# Patient Record
(demographics unavailable — no encounter records)

---

## 2024-10-07 NOTE — PHYSICAL EXAM
[No Acute Distress] : no acute distress [Well Nourished] : well nourished [Well Developed] : well developed [Well-Appearing] : well-appearing [Normal Sclera/Conjunctiva] : normal sclera/conjunctiva [PERRL] : pupils equal round and reactive to light [EOMI] : extraocular movements intact [Normal Outer Ear/Nose] : the outer ears and nose were normal in appearance [Normal Oropharynx] : the oropharynx was normal [No JVD] : no jugular venous distention [No Lymphadenopathy] : no lymphadenopathy [Supple] : supple [Thyroid Normal, No Nodules] : the thyroid was normal and there were no nodules present [No Respiratory Distress] : no respiratory distress  [No Accessory Muscle Use] : no accessory muscle use [Clear to Auscultation] : lungs were clear to auscultation bilaterally [Normal Rate] : normal rate  [Regular Rhythm] : with a regular rhythm [Normal S1, S2] : normal S1 and S2 [No Murmur] : no murmur heard [No Carotid Bruits] : no carotid bruits [No Varicosities] : no varicosities [Pedal Pulses Present] : the pedal pulses are present [No Edema] : there was no peripheral edema [No Extremity Clubbing/Cyanosis] : no extremity clubbing/cyanosis [Soft] : abdomen soft [Non-distended] : non-distended [No Masses] : no abdominal mass palpated [Normal Posterior Cervical Nodes] : no posterior cervical lymphadenopathy [Normal Anterior Cervical Nodes] : no anterior cervical lymphadenopathy [No CVA Tenderness] : no CVA  tenderness [No Spinal Tenderness] : no spinal tenderness [No Joint Swelling] : no joint swelling [Grossly Normal Strength/Tone] : grossly normal strength/tone [No Rash] : no rash [Coordination Grossly Intact] : coordination grossly intact [No Focal Deficits] : no focal deficits [Normal Gait] : normal gait [Normal Affect] : the affect was normal [Alert and Oriented x3] : oriented to person, place, and time [Normal Insight/Judgement] : insight and judgment were intact

## 2024-10-09 NOTE — HEALTH RISK ASSESSMENT
[0] : 2) Feeling down, depressed, or hopeless: Not at all (0) [PHQ-2 Negative - No further assessment needed] : PHQ-2 Negative - No further assessment needed [Never] : Never [[id number]] : 0

## 2024-10-09 NOTE — HEALTH RISK ASSESSMENT
[0] : 2) Feeling down, depressed, or hopeless: Not at all (0) [PHQ-2 Negative - No further assessment needed] : PHQ-2 Negative - No further assessment needed [Never] : Never [[id number]] : 0

## 2024-10-09 NOTE — HISTORY OF PRESENT ILLNESS
[Family Member] : family member [FreeTextEntry1] : 2-month f/u  [de-identified] : This is a 64 y/o female with a pmhx of HLD, HTN, Hypothyroid, PreDM, fibromyalgia, chronic shoulder pain, knee pain, bell's palsy, here today for 2-month f/u. Her weight remains stable around 130 +/- 5 lbs. She goes to therapy for her bell palsy. Patient feels well.  Denies chest pain, sob, ellison, dizziness, orthopnea, diaphoresis, palpitations, LE swelling, syncope, n/v, headache.

## 2024-10-09 NOTE — HISTORY OF PRESENT ILLNESS
[Family Member] : family member [FreeTextEntry1] : 2-month f/u  [de-identified] : This is a 62 y/o female with a pmhx of HLD, HTN, Hypothyroid, PreDM, fibromyalgia, chronic shoulder pain, knee pain, bell's palsy, here today for 2-month f/u. Her weight remains stable around 130 +/- 5 lbs. She goes to therapy for her bell palsy. Patient feels well.  Denies chest pain, sob, ellison, dizziness, orthopnea, diaphoresis, palpitations, LE swelling, syncope, n/v, headache.

## 2024-10-09 NOTE — HEALTH RISK ASSESSMENT
[0] : 2) Feeling down, depressed, or hopeless: Not at all (0) [PHQ-2 Negative - No further assessment needed] : PHQ-2 Negative - No further assessment needed [Never] : Never [[id number]] : 0

## 2024-10-09 NOTE — HISTORY OF PRESENT ILLNESS
[Family Member] : family member [FreeTextEntry1] : 2-month f/u  [de-identified] : This is a 64 y/o female with a pmhx of HLD, HTN, Hypothyroid, PreDM, fibromyalgia, chronic shoulder pain, knee pain, bell's palsy, here today for 2-month f/u. Her weight remains stable around 130 +/- 5 lbs. She goes to therapy for her bell palsy. Patient feels well.  Denies chest pain, sob, ellison, dizziness, orthopnea, diaphoresis, palpitations, LE swelling, syncope, n/v, headache.

## 2024-10-09 NOTE — ADDENDUM
[FreeTextEntry1] : This note was written by Xochitl Owen on 10/08/2024 acting as medical scribe for Dr. Akhil Rinaldi. I, Dr. Akhil Rinaldi, have read and attest that all the information, medical decision making and discharge instructions within are true and accurate.

## 2024-11-04 NOTE — PHYSICAL EXAM

## 2024-11-11 NOTE — ADDENDUM
PATIENT IN BED AWAKE, COMFORTABLE IN BED, HOOKED TO MONITOR, BREATHING EVEN AND 
UNLABORED. WILL CONTINUE TO MONITOR ACCORDINGLY. [FreeTextEntry1] : Entered by Phoenix Amato, acting as scribe for Dr. Gonzalez Woods. The documentation recorded by the scribe accurately reflects the service I personally performed and the decisions made by me.

## 2024-11-11 NOTE — ADDENDUM
[FreeTextEntry1] : Entered by Phoenix Amato, acting as scribe for Dr. Gonzalez Woods. The documentation recorded by the scribe accurately reflects the service I personally performed and the decisions made by me.

## 2024-11-11 NOTE — LETTER BODY
[FreeTextEntry1] : Avinash Rodriguez MD [address] [facility] [address] [facility], [address] [place] [address] [phone]  Dear Dr. Rodriguez,     Reason for visit: Microscopic hematuria     This is a 63 year-old woman with microscopic hematuria. Patient returns today for follow-up. Patient was last seen by Dr. Epstein last year had previous cystoscopy in 2020. Patient expresses interest in female urologist evaluation. She denies any gross hematuria, dysuria or urinary incontinence. The patient denies any aggravating or relieving factors. The patient denies any interference of function. The patient is entirely asymptomatic. All other review of systems are negative. She has no cancer in her family medical history. She has no previous surgical history. Past medical history, family history and social history were inquired and were noncontributory to current condition. The patient does not use tobacco or drink alcohol. Medications and allergies were reviewed. She has no known allergies to medication.     On examination, the patient is a anxious-appearing woman in no acute distress. She is alert and oriented follows commands. She has normal mood and affect. She is normocephalic. Neck is supple. Oral no thrush. Respirations are unlabored. Abdomen is soft and nontender. Bladder is nonpalpable. No CVA tenderness. Neurologically she is grossly intact. No peripheral edema. Skin without gross abnormality.      Assessment: Microscopic hematuria.     I counseled the patient on the various etiologies of the microscopic hematuria. I discussed the risk of occult malignancy. I counseled the patient about microscopic hematuria. I recommended the patient obtain urinalysis, urine culture, and urine cytology to evaluate for infections and high grade urothelial carcinoma. I counseled the patient about the procedures. I answered the patient's questions. The risks and expected outcomes were discussed. Patient is interested in a female urologist to continue her care. The patient will follow up as directed and contact me with any questions or concerns. Thank you for the opportunity to participate in the care of Ms. HAYES. I will keep you updated on her progress.     Plan: Urine culture. Urinalysis. Urine cytology. Follow-up with female urologist.

## 2024-11-11 NOTE — LETTER BODY
[FreeTextEntry1] : Avinash Rodriguez MD [address] [facility], [address] [place] [address] [phone]  Dear Dr. Rodriguez,     Reason for visit: Microscopic hematuria     This is a 63 year-old woman with microscopic hematuria. Patient returns today for follow-up. Patient was last seen by Dr. Epstein last year had previous cystoscopy in 2020. Patient expresses interest in female urologist evaluation. She denies any gross hematuria, dysuria or urinary incontinence. The patient denies any aggravating or relieving factors. The patient denies any interference of function. The patient is entirely asymptomatic. All other review of systems are negative. She has no cancer in her family medical history. She has no previous surgical history. Past medical history, family history and social history were inquired and were noncontributory to current condition. The patient does not use tobacco or drink alcohol. Medications and allergies were reviewed. She has no known allergies to medication.     On examination, the patient is a anxious-appearing woman in no acute distress. She is alert and oriented follows commands. She has normal mood and affect. She is normocephalic. Neck is supple. Oral no thrush. Respirations are unlabored. Abdomen is soft and nontender. Bladder is nonpalpable. No CVA tenderness. Neurologically she is grossly intact. No peripheral edema. Skin without gross abnormality.      Assessment: Microscopic hematuria.     I counseled the patient on the various etiologies of the microscopic hematuria. I discussed the risk of occult malignancy. I counseled the patient about microscopic hematuria. I recommended the patient obtain urinalysis, urine culture, and urine cytology to evaluate for infections and high grade urothelial carcinoma. I counseled the patient about the procedures. I answered the patient's questions. The risks and expected outcomes were discussed. Patient is interested in a female urologist to continue her care. The patient will follow up as directed and contact me with any questions or concerns. Thank you for the opportunity to participate in the care of Ms. HAYES. I will keep you updated on her progress.     Plan: Urine culture. Urinalysis. Urine cytology. Follow-up with female urologist.

## 2024-11-11 NOTE — HISTORY OF PRESENT ILLNESS
[FreeTextEntry1] : Follow-up female hematuria.  Patient today was discussed with Dr. Lucian Antoine.  She denies any gross hematuria,  Repeat urinalysis.  Patient is interested in a female urologist to continue her care.  Please refer to URO Consult Note.

## 2024-11-30 NOTE — PHYSICAL EXAM
[No Acute Distress] : no acute distress [Well Nourished] : well nourished [Well Developed] : well developed [Well-Appearing] : well-appearing [Normal Sclera/Conjunctiva] : normal sclera/conjunctiva [PERRL] : pupils equal round and reactive to light [EOMI] : extraocular movements intact [Normal Outer Ear/Nose] : the outer ears and nose were normal in appearance [Normal Oropharynx] : the oropharynx was normal [No JVD] : no jugular venous distention [No Lymphadenopathy] : no lymphadenopathy [Supple] : supple [Thyroid Normal, No Nodules] : the thyroid was normal and there were no nodules present [No Respiratory Distress] : no respiratory distress  [No Accessory Muscle Use] : no accessory muscle use [Clear to Auscultation] : lungs were clear to auscultation bilaterally [Normal Rate] : normal rate  [Regular Rhythm] : with a regular rhythm [Normal S1, S2] : normal S1 and S2 [No Murmur] : no murmur heard [No Carotid Bruits] : no carotid bruits [No Varicosities] : no varicosities [Pedal Pulses Present] : the pedal pulses are present [No Edema] : there was no peripheral edema [No Extremity Clubbing/Cyanosis] : no extremity clubbing/cyanosis [Soft] : abdomen soft [Non-distended] : non-distended [Normal Posterior Cervical Nodes] : no posterior cervical lymphadenopathy [Normal Anterior Cervical Nodes] : no anterior cervical lymphadenopathy [No CVA Tenderness] : no CVA  tenderness [No Spinal Tenderness] : no spinal tenderness [No Joint Swelling] : no joint swelling [Grossly Normal Strength/Tone] : grossly normal strength/tone [No Rash] : no rash [Coordination Grossly Intact] : coordination grossly intact [No Focal Deficits] : no focal deficits [Normal Gait] : normal gait [Normal Affect] : the affect was normal [Alert and Oriented x3] : oriented to person, place, and time [Normal Insight/Judgement] : insight and judgment were intact

## 2024-12-03 NOTE — HEALTH RISK ASSESSMENT
[0] : 2) Feeling down, depressed, or hopeless: Not at all (0) [PHQ-2 Negative - No further assessment needed] : PHQ-2 Negative - No further assessment needed [Never] : Never [[id number]] : 0

## 2024-12-03 NOTE — ADDENDUM
[FreeTextEntry1] : This note was written by Xochitl Owen on 12/03/2024 acting as medical scribe for Dr. Akhil Rinaldi. I, Dr. Akhil Rinaldi, have read and attest that all the information, medical decision making and discharge instructions within are true and accurate.

## 2024-12-03 NOTE — HISTORY OF PRESENT ILLNESS
[FreeTextEntry1] : 2-month f/u [de-identified] : This is a 64 y/o female with a pmhx of HLD, HTN, Hypothyroid, PreDM, fibromyalgia, chronic shoulder pain, knee pain, bell's palsy, here today for 2-month f/u.   Denies chest pain, SOB, MARTINEZ, dizziness, diaphoresis, palpitations, LE swelling, orthopnea, syncope, n/v, headache.

## 2025-01-13 NOTE — HEALTH RISK ASSESSMENT
[0] : 2) Feeling down, depressed, or hopeless: Not at all (0) [PHQ-2 Negative - No further assessment needed] : PHQ-2 Negative - No further assessment needed [Never] : Never [[id number]] : 0

## 2025-01-13 NOTE — HISTORY OF PRESENT ILLNESS
[Family Member] : family member [FreeTextEntry1] : 3-month f/u  [de-identified] : This is a 62 y/o female with a pmhx of HLD, HTN, Hypothyroid, PreDM, fibromyalgia, chronic shoulder pain, knee pain, bell's palsy, here today for 3-month f/u. Pt did a CT abd/pelvis on 1/4 that showed No CT evidence of occult malignancy, however limited evaluation of solid organ vessels secondary to lack of intravenous contrast administration. Pt says she has pain on her L shoulder due to a recent fall. Pt says she has been taking Tylenol and a muscle relaxer for her shoulder pain, follows ortho. Pt says she has a runny nose for about 2-3 days now. Denies any fever, cough, chills.  Denies chest pain, SOB, MARTINEZ, dizziness, diaphoresis, palpitations, LE swelling, orthopnea, syncope, n/v, headache.

## 2025-01-13 NOTE — HISTORY OF PRESENT ILLNESS
[FreeTextEntry1] : Ms. RILEY HAYES is a 63-year-old female who returns today in follow up with regard to a history of hypothyroidism. She is s/p s/p total thyroidectomy in 2017. The patient was diagnosed with small focus of papillary thyroid ca noted at her surgery for very large nodular goiter several years ago.   Thyroglobulin on 8/8/23 at 0.26 with negative antibodies.  Thyroid US 7/2023 showed level 2 and level 5 LN. Bx per Dr. Ramos- result pos for reactive LN.  Repeat thyroid US 1/30/24 did show indeterminate isoechoic nodule with cystic spaces in the midline neck, measuring 2.2 x 0.9 x 1.5 cm possibly residual thyroid tissue. Consider further evaluation with CT neck with contrast. Unchanged b/l cervical lymph nodes, without suspicious imaging features.  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  For the hypothyroidism, the patient is currently taking LT4 112 mcg daily.   TSH had returned low at 0.05 in Feb 2024 while taking Synthroid 137mcg daily. Latest TSH returned highly elevated at 16.60 on 01/07/25.  She has been compliant in taking the LT4 daily, away from food or any medication that may inhibit absorption. She has tolerated this medication well. Without any apparent adverse effects. She denies any temperature intolerance, significant weight changes, or severe fatigue. She in addition denies any palpitations, tremors, anxiousness, change in bowel habits or significant change in moods.  Patient has lost about 30 pounds in 2023. She has been following dietary discretion and increased her physical activity.  Her current weight is ?? pounds, previously 136 pounds in November 2024.  ____________________________________________________________________________________________________ Had colonoscopy with Dr. Spear.  Had received first cortisone injection in right shoulder and 3rd injection in left knee. Following with ortho and is to start PT.   Additional medical history includes of hypertension, hypokalemia, CPA tumor, Pre-Dm, Thalassemia Trait and vitamin d deficiency. 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 Still pain rt side of face, rt shoulder, and in multiple joints. Undergoing Rheumatology eval. Is doing electrode therapy.   Incr peter/renin ratio- is though now on spironolactone  Neurologist: Dr. Guerrier - retired-now to find new neurologist. Nephrologist : Dr. Nunn Surgeon: Dr. Elif Garza?

## 2025-01-13 NOTE — HEALTH RISK ASSESSMENT
[0] : 2) Feeling down, depressed, or hopeless: Not at all (0) [PHQ-2 Negative - No further assessment needed] : PHQ-2 Negative - No further assessment needed [Never] : Never [[id number]] : 0

## 2025-01-13 NOTE — ADDENDUM
[FreeTextEntry1] : This note was written by Xochitl Owen on 01/07/2025 acting as medical scribe for Dr. Akhil Rinaldi. I, Dr. Akhil Rinaldi, have read and attest that all the information, medical decision making and discharge instructions within are true and accurate.

## 2025-01-13 NOTE — HISTORY OF PRESENT ILLNESS
[Family Member] : family member [FreeTextEntry1] : 3-month f/u  [de-identified] : This is a 62 y/o female with a pmhx of HLD, HTN, Hypothyroid, PreDM, fibromyalgia, chronic shoulder pain, knee pain, bell's palsy, here today for 3-month f/u. Pt did a CT abd/pelvis on 1/4 that showed No CT evidence of occult malignancy, however limited evaluation of solid organ vessels secondary to lack of intravenous contrast administration. Pt says she has pain on her L shoulder due to a recent fall. Pt says she has been taking Tylenol and a muscle relaxer for her shoulder pain, follows ortho. Pt says she has a runny nose for about 2-3 days now. Denies any fever, cough, chills.  Denies chest pain, SOB, MARTINEZ, dizziness, diaphoresis, palpitations, LE swelling, orthopnea, syncope, n/v, headache.

## 2025-01-13 NOTE — HEALTH RISK ASSESSMENT
[0] : 2) Feeling down, depressed, or hopeless: Not at all (0) [PHQ-2 Negative - No further assessment needed] : PHQ-2 Negative - No further assessment needed [Never] : Never [[id number]] : 0

## 2025-01-13 NOTE — HISTORY OF PRESENT ILLNESS
[Family Member] : family member [FreeTextEntry1] : 3-month f/u  [de-identified] : This is a 62 y/o female with a pmhx of HLD, HTN, Hypothyroid, PreDM, fibromyalgia, chronic shoulder pain, knee pain, bell's palsy, here today for 3-month f/u. Pt did a CT abd/pelvis on 1/4 that showed No CT evidence of occult malignancy, however limited evaluation of solid organ vessels secondary to lack of intravenous contrast administration. Pt says she has pain on her L shoulder due to a recent fall. Pt says she has been taking Tylenol and a muscle relaxer for her shoulder pain, follows ortho. Pt says she has a runny nose for about 2-3 days now. Denies any fever, cough, chills.  Denies chest pain, SOB, MARTINEZ, dizziness, diaphoresis, palpitations, LE swelling, orthopnea, syncope, n/v, headache.

## 2025-01-24 NOTE — ASSESSMENT
[FreeTextEntry1] : 1) Nevi/lentigines - Counseled about clinically benign lesions - Discussed regular OTC sunscreen use, SPF 30 or higher   2) Excess skin s/p weight loss  - Advised to continue to f/u with PCP for preventative standard cancer screenings - Discussed there are no topical agents to help treat excess skin. We discussed a surgical referral may be considered, though she declined to do so today - Discussed liberal moisturizer use   RTC prn   ** Attending only per patient request**

## 2025-01-24 NOTE — HISTORY OF PRESENT ILLNESS
[FreeTextEntry1] : f/u spots [de-identified] : 64 y/o F w/ spots on the skin and skin sagging after losing weight over years.   ** Attending only per patient request**

## 2025-01-24 NOTE — PHYSICAL EXAM
[FreeTextEntry3] : focused exam of a few areas on the trunk and upper extremities per patient request Fairly uniform and regular brown macules and papules on the trunk and upper extremities

## 2025-02-04 NOTE — ADDENDUM
[FreeTextEntry1] : Blood will be drawn in office today.  This note was written by Jamee Randle on 01/31/2025 acting as medical scribe for Dr. Haim Samson. I, Dr. Haim Samson, have read and attest that all the information, medical decision making and discharge instructions within are true and accurate.

## 2025-02-04 NOTE — HISTORY OF PRESENT ILLNESS
[FreeTextEntry1] : Ms. RILEY HAYES is a 63-year-old female who returns today in follow up with regard to a history of hypothyroidism. She is s/p s/p total thyroidectomy in 2017. The patient was diagnosed with small focus of papillary thyroid ca noted at her surgery for very large nodular goiter several years ago.   Additional medical history includes of hypertension, hypokalemia, CPA tumor, Pre-Dm, Thalassemia Trait and vitamin d deficiency. 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 Still pain rt side of face, rt shoulder, and in multiple joints. Undergoing Rheumatology eval. Is doing electrode therapy.   Thyroglobulin stable on 08/05/24 at 13.50 with negative antibodies.  Thyroid US 7/2023 showed level 2 and level 5 LN. Bx per Dr. Ramos- result pos for reactive LN.  Repeat thyroid US 1/30/24 did show indeterminate isoechoic nodule with cystic spaces in the midline neck, measuring 2.2 x 0.9 x 1.5 cm possibly residual thyroid tissue. Consider further evaluation with CT neck with contrast. Unchanged b/l cervical lymph nodes, without suspicious imaging features.  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  For the hypothyroidism, the patient is currently taking LT4 125 mcg daily.  She did miss 1-2 doses of Synthroid due to the flu.  TSH had returned low at 0.05 in Feb 2024 while taking Synthroid 137 mcg daily. Latest TSH returned 4.69 on 6/14/24 on dose of 112 mcg daily.   Recent TSH highly elevated at 16.60 on 01/06/25.  She has been compliant in taking the LT4 daily, away from food or any medication that may inhibit absorption. She has tolerated this medication well. Without any apparent adverse effects. She denies any temperature intolerance, significant weight changes, or severe fatigue. She in addition denies any palpitations, tremors, anxiousness, change in bowel habits or significant change in moods.  Patient has lost about 30 pounds over the last year. She has been following dietary discretion and increased her physical activity. Her dermatologist was worried about anorexia as the patient had lost some weight; now has gained some back. Her current weight is 133 pounds, previously 120 pounds in Nov 2024.  Saw ophthalmology and being monitored for floaters. ____________________________________________________________________________________________________ Had colonoscopy with Dr. Spear.  Received first cortisone injection in right shoulder and 3rd injection in left knee. Following with ortho and has started PT.  Has been going to PT for her Bell's palsy and her shoulder.  Like Opal, she too contracted the flu and accompanied him to [facility]. She had shivers but denied sweats. While Opal received a Covid test, she was not checked at all. Entire family is sick with the flu.  Incr peter/renin ratio- is though now on spironolactone  Neurologist: Dr. Guerrier - retired-now to find new neurologist. Nephrologist : Dr. Nunn Surgeon: Dr. Elif Garza?

## 2025-02-04 NOTE — ADDENDUM
[FreeTextEntry1] : Blood will be drawn in office today.  This note was written by Jamee Randle on 01/31/2025 acting as medical scribe for Dr. Hiam Samson. I, Dr. Haim Samson, have read and attest that all the information, medical decision making and discharge instructions within are true and accurate.

## 2025-02-07 NOTE — ASSESSMENT
[FreeTextEntry1] : 1- check urine - ( voided before SC )  2- discussed that if urine again blood with epith cells - optiions are recheck SC urine or proceed with eval sunitha  FARA  and cysto -- patient woud like to proceed eval as she states that she is a hyponcondriac

## 2025-02-07 NOTE — PHYSICAL EXAM
[Fully active, able to carry on all pre-disease performance without restriction] : Status 0 - Fully active, able to carry on all pre-disease performance without restriction [Normal] : no peripheral adenopathy appreciated [de-identified] : b/l mild LE lymphedema [de-identified] : no CVAT [de-identified] : mild left Bell's Palsy

## 2025-02-07 NOTE — PHYSICAL EXAM
[Normal Appearance] : normal appearance [Well Groomed] : well groomed [General Appearance - In No Acute Distress] : no acute distress [Edema] : no peripheral edema [Respiration, Rhythm And Depth] : normal respiratory rhythm and effort [Exaggerated Use Of Accessory Muscles For Inspiration] : no accessory muscle use [Abdomen Soft] : soft [Abdomen Tenderness] : non-tender [Costovertebral Angle Tenderness] : no ~M costovertebral angle tenderness [Normal Station and Gait] : the gait and station were normal for the patient's age [] : no rash [No Focal Deficits] : no focal deficits [Oriented To Time, Place, And Person] : oriented to person, place, and time [Affect] : the affect was normal [Mood] : the mood was normal [No Palpable Adenopathy] : no palpable adenopathy [de-identified] : pvr- zero

## 2025-02-07 NOTE — HISTORY OF PRESENT ILLNESS
[FreeTextEntry1] : Nov 2024 ( Dr Gonzalez Woods) This is a 63 year-old woman with microscopic hematuria. Patient returns today for follow-up. Patient was last seen by Dr. Epstein last year had previous cystoscopy in 2020. Patient expresses interest in female urologist evaluation. She denies any gross hematuria, dysuria or urinary incontinence. The patient denies any aggravating or relieving factors. The patient denies any interference of function. The patient is entirely asymptomatic. All other review of systems are negative. She has no cancer in her family medical history. She has no previous surgical history. Past medical history, family history and social history were inquired and were noncontributory to current condition. The patient does not use tobacco or drink alcohol. Medications and allergies were reviewed. She has no known allergies to medication. Plan: Urine culture. Urinalysis. Urine cytology. Follow-up with female urologist.  UA : 6 red cells and 7 epith cells, cytology and cx : negative.  Here for f/u: some urgency , no INC unless if holds too long, no dysuria or hematjira , occa liner use  nocturia 4-5 x related to fljuids  no gross hematria or dysuria  CT SCAN ( Jan 2025)  KIDNEYS/URETERS: Within normal limits.  BLADDER: Within normal limits. REPRODUCTIVE ORGANS: Uterus and adnexa within normal limits

## 2025-02-07 NOTE — END OF VISIT
[] : Fellow [FreeTextEntry3] : Hgb sl decrease, w/u as outlined above. Recheck Fe labs. B12, folate wnl 9/2024. > 25 lb wt loss now reversin. Had neg CT C/A/P 1/2025.

## 2025-02-07 NOTE — ASSESSMENT
[Medication(s)] : Medication(s) [Palliative] : Goals of care discussed with patient: Palliative [Palliative Care Plan] : not applicable at this time [With Patient/Caregiver] : With Patient/Caregiver [Designated Health Care Proxy] : Designated Health Care Proxy [Name: ___] : Name: [unfilled] [Relationship: ___] : Relationship: [unfilled] [FreeTextEntry1] : Mild microcytic anemia in 62 yo woman of [place] background.   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 She has no evidence of iron deficiency CBC results reviewed and d/w pt WBC 7.0, Hgb 10.7, MCV 70.8, Plt 312K, nl diff leukocytosis, thrombocytosis for the most part have not recurred   has had low serum B12 levels in past, taking B12 po and level in Sept 2024 adeqaute Unclear etiology of drop in hemoglobin -- may be secondary to inadequate dietary intake vs. hypothyroidism vs. microscopic blood loss >> Will repeat iron studies + ferritin today >> Monitor CBC >> No gross evidence of GI bleed or hematuria Longstanding facial pain, h/o Bell's Palsy - Follow up closely with PCP  follow up CMP, LDH RV 6 months or sooner if needed  Patient seen and discussed with Dr. Rock. Ruthie Paula DO, MPH Medical Oncology Fellow [AdvancecareDate] : 08/01/2024

## 2025-02-07 NOTE — ASSESSMENT
[Medication(s)] : Medication(s) [Palliative] : Goals of care discussed with patient: Palliative [Palliative Care Plan] : not applicable at this time [With Patient/Caregiver] : With Patient/Caregiver [Designated Health Care Proxy] : Designated Health Care Proxy [Name: ___] : Name: [unfilled] [Relationship: ___] : Relationship: [unfilled] [FreeTextEntry1] : Mild microcytic anemia in 62 yo woman of [place] background.   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 She has no evidence of iron deficiency CBC results reviewed and d/w pt WBC 7.0, Hgb 10.7, MCV 70.8, Plt 312K, nl diff leukocytosis, thrombocytosis for the most part have not recurred   has had low serum B12 levels in past, taking B12 po and level in Sept 2024 adeqaute Unclear etiology of drop in hemoglobin -- may be secondary to inadequate dietary intake vs. hypothyroidism vs. microscopic blood loss >> Will repeat iron studies + ferritin today >> Monitor CBC >> No gross evidence of GI bleed or hematuria Longstanding facial pain, h/o Bell's Palsy - Follow up closely with PCP  follow up CMP, LDH RV 6 months or sooner if needed  Patient seen and discussed with Dr. Rock. Ruthie Paula DO, MPH Medical Oncology Fellow [AdvancecareDate] : 08/01/2024

## 2025-02-07 NOTE — HISTORY OF PRESENT ILLNESS
[Disease:__________________________] : Disease: [unfilled] [de-identified] : Ms. Edwards is a 60 year old woman referred for evaluation of microcytic anemia. Father and one son has h/o thalassemia, otherwise not specified  She states that she has thalassemia trait, has been tested in the past.  Hgb electrophoresis x 2 neg for beta thal trait..  She also has had thrombocytosis noted in 6 of ten CBCs since 9/2020, ranging between 403K and 548K.  Three of ten CBCs had mild incr WBC up to 11.16 with nl diffs; Hgb has ranged bet. 11.1 and 12.4, MCV bet. 69.6-71.5. She has a h/o excised benign left breast cyst, left leg lymphedema, left temporal Schwannoma treated with radiotherapy which subsequently lead to left Bell's Palsy from which she has mostly recovered from, B12 deficiency, HTN, DJD, HLD. She has had a thyroidectomy for papillary thyroid cancer. She has no GI c/o, no melena, BRBPR. [de-identified] : Moderate fatigue Genetic test (+) for alpha thal trait The two mutated alpha  genes are arranged in trans stable vestibular Schwannoma being followed had 2500 cGy RT in 2019 MRI brain 5/22/2023 unchanged findings   2/6/25 Here for follow up. Reports significant weight loss over the last year but also significant life stressors. CT CAP negative for occult malignancy, no hepatosplenomegaly or lymphadenopathy. Hgb downtrending today. Denies blood per rectum/melena, but was noted to have some microscopic hematuria for which she is following with urology. Last colonoscopy 12/2023 with a 6 mm polyp, which path showed to be a sessile serrated lesion/polyp.  Continues to have chronic numbness, pain  left side of face, lips post Bell's Palsy.

## 2025-02-07 NOTE — PHYSICAL EXAM
[Fully active, able to carry on all pre-disease performance without restriction] : Status 0 - Fully active, able to carry on all pre-disease performance without restriction [Normal] : no peripheral adenopathy appreciated [de-identified] : b/l mild LE lymphedema [de-identified] : no CVAT [de-identified] : mild left Bell's Palsy

## 2025-02-07 NOTE — HISTORY OF PRESENT ILLNESS
[Disease:__________________________] : Disease: [unfilled] [de-identified] : Ms. Edwards is a 60 year old woman referred for evaluation of microcytic anemia. Father and one son has h/o thalassemia, otherwise not specified  She states that she has thalassemia trait, has been tested in the past.  Hgb electrophoresis x 2 neg for beta thal trait..  She also has had thrombocytosis noted in 6 of ten CBCs since 9/2020, ranging between 403K and 548K.  Three of ten CBCs had mild incr WBC up to 11.16 with nl diffs; Hgb has ranged bet. 11.1 and 12.4, MCV bet. 69.6-71.5. She has a h/o excised benign left breast cyst, left leg lymphedema, left temporal Schwannoma treated with radiotherapy which subsequently lead to left Bell's Palsy from which she has mostly recovered from, B12 deficiency, HTN, DJD, HLD. She has had a thyroidectomy for papillary thyroid cancer. She has no GI c/o, no melena, BRBPR. [de-identified] : Moderate fatigue Genetic test (+) for alpha thal trait The two mutated alpha  genes are arranged in trans stable vestibular Schwannoma being followed had 2500 cGy RT in 2019 MRI brain 5/22/2023 unchanged findings   2/6/25 Here for follow up. Reports significant weight loss over the last year but also significant life stressors. CT CAP negative for occult malignancy, no hepatosplenomegaly or lymphadenopathy. Hgb downtrending today. Denies blood per rectum/melena, but was noted to have some microscopic hematuria for which she is following with urology. Last colonoscopy 12/2023 with a 6 mm polyp, which path showed to be a sessile serrated lesion/polyp.  Continues to have chronic numbness, pain  left side of face, lips post Bell's Palsy.

## 2025-02-07 NOTE — REVIEW OF SYSTEMS
[Loss of Hearing] : loss of hearing [Joint Pain] : joint pain [Muscle Pain] : muscle pain [Dizziness] : dizziness [Difficulty Walking] : difficulty walking [Negative] : Allergic/Immunologic [Dysphagia] : no dysphagia [Odynophagia] : no odynophagia [Mucosal Pain] : no mucosal pain [Abdominal Pain] : no abdominal pain [Joint Stiffness] : no joint stiffness [Skin Wound] : no skin wound [Confused] : no confusion [Easy Bleeding] : no tendency for easy bleeding [Easy Bruising] : no tendency for easy bruising [Swollen Glands] : no swollen glands [FreeTextEntry9] : bilat shoulder pain [de-identified] : Schwannoma, as above; Bell's Palsy, occas fall, headache; facial pain [de-identified] : thyroidectomy for thyroid ca, papillary, NAFISA

## 2025-02-08 NOTE — ADDENDUM
[FreeTextEntry1] : This note was written by Ana Salinas on 02/07/2025 acting as medical scribe for Dr. Akhil Rinaldi. I, Dr. Akhil Rinaldi, have read and attest that all the information, medical decision making and discharge instructions within are true and accurate.

## 2025-02-08 NOTE — HEALTH RISK ASSESSMENT
[0] : 2) Feeling down, depressed, or hopeless: Not at all (0) [PHQ-2 Negative - No further assessment needed] : PHQ-2 Negative - No further assessment needed [Never] : Never [[id number]] : 0

## 2025-02-08 NOTE — PHYSICAL EXAM
[No Acute Distress] : no acute distress [Well Nourished] : well nourished [Well Developed] : well developed [Well-Appearing] : well-appearing [Normal Sclera/Conjunctiva] : normal sclera/conjunctiva [PERRL] : pupils equal round and reactive to light [EOMI] : extraocular movements intact [Normal Outer Ear/Nose] : the outer ears and nose were normal in appearance [No JVD] : no jugular venous distention [No Lymphadenopathy] : no lymphadenopathy [Supple] : supple [Thyroid Normal, No Nodules] : the thyroid was normal and there were no nodules present [No Respiratory Distress] : no respiratory distress  [No Accessory Muscle Use] : no accessory muscle use [Clear to Auscultation] : lungs were clear to auscultation bilaterally [Normal Rate] : normal rate  [Regular Rhythm] : with a regular rhythm [Normal S1, S2] : normal S1 and S2 [No Murmur] : no murmur heard [No Carotid Bruits] : no carotid bruits [No Varicosities] : no varicosities [Pedal Pulses Present] : the pedal pulses are present [No Edema] : there was no peripheral edema [No Extremity Clubbing/Cyanosis] : no extremity clubbing/cyanosis [Soft] : abdomen soft [Non Tender] : non-tender [Non-distended] : non-distended [Normal Bowel Sounds] : normal bowel sounds [Normal Posterior Cervical Nodes] : no posterior cervical lymphadenopathy [Normal Anterior Cervical Nodes] : no anterior cervical lymphadenopathy [No CVA Tenderness] : no CVA  tenderness [No Spinal Tenderness] : no spinal tenderness [No Joint Swelling] : no joint swelling [Grossly Normal Strength/Tone] : grossly normal strength/tone [No Rash] : no rash [Coordination Grossly Intact] : coordination grossly intact [No Focal Deficits] : no focal deficits [Normal Gait] : normal gait [Normal Affect] : the affect was normal [Normal Insight/Judgement] : insight and judgment were intact [Alert and Oriented x3] : oriented to person, place, and time [de-identified] : left pharynx with ?mass or swelling, unclear finding no pus

## 2025-02-08 NOTE — PHYSICAL EXAM
[No Acute Distress] : no acute distress [Well Nourished] : well nourished [Well Developed] : well developed [Well-Appearing] : well-appearing [Normal Sclera/Conjunctiva] : normal sclera/conjunctiva [PERRL] : pupils equal round and reactive to light [EOMI] : extraocular movements intact [Normal Outer Ear/Nose] : the outer ears and nose were normal in appearance [No JVD] : no jugular venous distention [No Lymphadenopathy] : no lymphadenopathy [Supple] : supple [Thyroid Normal, No Nodules] : the thyroid was normal and there were no nodules present [No Respiratory Distress] : no respiratory distress  [No Accessory Muscle Use] : no accessory muscle use [Clear to Auscultation] : lungs were clear to auscultation bilaterally [Normal Rate] : normal rate  [Regular Rhythm] : with a regular rhythm [Normal S1, S2] : normal S1 and S2 [No Murmur] : no murmur heard [No Carotid Bruits] : no carotid bruits [No Varicosities] : no varicosities [Pedal Pulses Present] : the pedal pulses are present [No Edema] : there was no peripheral edema [No Extremity Clubbing/Cyanosis] : no extremity clubbing/cyanosis [Soft] : abdomen soft [Non Tender] : non-tender [Non-distended] : non-distended [Normal Bowel Sounds] : normal bowel sounds [Normal Posterior Cervical Nodes] : no posterior cervical lymphadenopathy [Normal Anterior Cervical Nodes] : no anterior cervical lymphadenopathy [No CVA Tenderness] : no CVA  tenderness [No Spinal Tenderness] : no spinal tenderness [No Joint Swelling] : no joint swelling [Grossly Normal Strength/Tone] : grossly normal strength/tone [No Rash] : no rash [Coordination Grossly Intact] : coordination grossly intact [No Focal Deficits] : no focal deficits [Normal Gait] : normal gait [Normal Affect] : the affect was normal [Normal Insight/Judgement] : insight and judgment were intact [Alert and Oriented x3] : oriented to person, place, and time [de-identified] : left pharynx with ?mass or swelling, unclear finding no pus

## 2025-02-08 NOTE — HISTORY OF PRESENT ILLNESS
[Family Member] : family member [FreeTextEntry1] : cough [de-identified] : Ms. HAYES is a 63 year old F with PMHx of HLD, HTN, Hypothyroid, PreDM, fibromyalgia, chronic shoulder pain, knee pain, bell's palsy, presenting for cough. About a month ago, she reports cough, chills, runny nose, right ear discomfort and was swabbed at urgent care, covid and flu were both neg. Patient said only residual sypmtoms is ongoing cough and R ear opain. Pt still has a cough and took Robitussin prn Denies fever. Denies chest pain, sob, ellison, dizziness, diaphoresis, palpitations, LE swelling, orthopnea, syncope, n/v, headache.

## 2025-02-08 NOTE — HEALTH RISK ASSESSMENT
[0] : 2) Feeling down, depressed, or hopeless: Not at all (0) [PHQ-2 Negative - No further assessment needed] : PHQ-2 Negative - No further assessment needed [Never] : Never [[id number]] : 0

## 2025-02-08 NOTE — HISTORY OF PRESENT ILLNESS
[Family Member] : family member [FreeTextEntry1] : cough [de-identified] : Ms. HAYES is a 63 year old F with PMHx of HLD, HTN, Hypothyroid, PreDM, fibromyalgia, chronic shoulder pain, knee pain, bell's palsy, presenting for cough. About a month ago, she reports cough, chills, runny nose, right ear discomfort and was swabbed at urgent care, covid and flu were both neg. Patient said only residual sypmtoms is ongoing cough and R ear opain. Pt still has a cough and took Robitussin prn Denies fever. Denies chest pain, sob, ellison, dizziness, diaphoresis, palpitations, LE swelling, orthopnea, syncope, n/v, headache.

## 2025-03-04 NOTE — HISTORY OF PRESENT ILLNESS
[FreeTextEntry1] : 62 yo female, hx of thal trati, anemia, bells palsy, referred for colon cancer screening. Last colonoscopy more than 10 years ago. No family hx of colon cancer. No rectal bleeding. NO sob or cp.  RTO today 1/25/24- colonoscopy with dim serrated polyp and diverticulosis.Had mild diverticulitis post procedure responded to doxycyline. Recent fell on steps, has right rib pain.  RTO 7/24/24- s/p diverticulitis after colonoscopy. Feels better, has normal bms. has right shoulder and knee pain s/p injection. Has bells palsy. Doing relatively well per pts description. NO sob or cp.Gained 8 lbs. since May 2024  RTO 3/4/25- weight STABLE since 8/24- has some bloating, no fever or chills. S/p recent cold. states COVID neg. No n/v. Has regular bms.Reviewed previous colonoscopy this vandana which demonstated diverticluosis.Recent cat scan abd/pelvis without significant issues. [de-identified] : 2022 chest /abd/pelvis neg  01/2025 neg

## 2025-03-04 NOTE — REVIEW OF SYSTEMS
[Recent Weight Gain (___ Lbs)] : recent [unfilled] ~Ulb weight gain [As Noted in HPI] : as noted in HPI [Negative] : Heme/Lymph [FreeTextEntry9] : right rib pain

## 2025-03-04 NOTE — PHYSICAL EXAM

## 2025-03-04 NOTE — ASSESSMENT
[FreeTextEntry1] : 62 yo female, hx of thal trati, anemia, bells palsy, referred for colon cancer screening. Last colonoscopy more than 10 years ago. No family hx of colon cancer. No rectal bleeding. NO sob or cp.  RTO today 1/25/24- colonoscopy with dim serrated polyp and diverticulosis.Had mild diverticulitis post procedure responded to doxycyline. Recent fell on steps, has right rib pain.  RTO 7/24/24- s/p diverticulitis after colonoscopy. Feels better, has normal bms. has right shoulder and knee pain s/p injection. Has bells palsy. Doing relatively well per pts description. NO sob or cp.Gained 8 lbs. since May 2024  RTO 3/4/25- weight STABLE since 8/24- has some bloating, no fever or chills. S/p recent cold. states COVID neg. No n/v. Has regular bms.Reviewed previous colonoscopy this vandana which demonstated diverticluosis.  IMP: 1. serrated polyp 2. stable weight  3. Hx of diverticulosis  PLAN: 1prn followup 2. next colonoscopy 7 years due to serrated polyp 3. followup wiht Dr. Rodriguez

## 2025-03-07 NOTE — HISTORY OF PRESENT ILLNESS
[FreeTextEntry1] : patient here for f/u dysuria and urgency mayur at night for past 2 d no hematuria or INC , no fever or SP or flnak pain reports recent GI eval wiht colonscopy ( neg by report ) not due again for 10 years  avg fluid intake  no bowel issues

## 2025-03-07 NOTE — PHYSICAL EXAM
[Normal Appearance] : normal appearance [Well Groomed] : well groomed [General Appearance - In No Acute Distress] : no acute distress [Edema] : no peripheral edema [Respiration, Rhythm And Depth] : normal respiratory rhythm and effort [Exaggerated Use Of Accessory Muscles For Inspiration] : no accessory muscle use [Abdomen Soft] : soft [Abdomen Tenderness] : non-tender [Urethral Meatus] : normal urethra [Vagina] : normal vaginal exam [Normal Station and Gait] : the gait and station were normal for the patient's age [] : no rash [No Focal Deficits] : no focal deficits [Oriented To Time, Place, And Person] : oriented to person, place, and time [Affect] : the affect was normal [Mood] : the mood was normal [de-identified] : SC after sterile prep with 14 f cath and urien colleced and cath removed intact - bethany procedure [Chaperone Present] : A chaperone was present in the examining room during all aspects of the physical examination [FreeTextEntry2] : FAWAD Johnson

## 2025-04-02 NOTE — HEALTH RISK ASSESSMENT
[0] : 2) Feeling down, depressed, or hopeless: Not at all (0) [PHQ-2 Negative - No further assessment needed] : PHQ-2 Negative - No further assessment needed [Never] : Never [[id number]] : 0

## 2025-04-02 NOTE — HISTORY OF PRESENT ILLNESS
[Family Member] : family member [FreeTextEntry1] : 2-month f/u [de-identified] : Ms. HAYES is a 63 year old F with PMHx of HLD, HTN, Hypothyroid, PreDM, fibromyalgia, chronic shoulder pain, knee pain, bell's palsy, presenting for 2-month f/u. Pt wants to fill me in on all the doctor's she saw since last visit thus reviewed recent specialist visit notes and tests she had done.  Overall, pt feels well. No complaints. Denies chest pain, SOB, MARTINEZ, dizziness, diaphoresis, palpitations, orthopnea, syncope, n/v, headache. Wants more PT for bells palsy.

## 2025-04-02 NOTE — ADDENDUM
[FreeTextEntry1] : This note was written by Xochitl Owen on 04/01/2025 acting as medical scribe for Dr. Akhil Rinaldi. I, Dr. Akhil Rinaldi, have read and attest that all the information, medical decision making and discharge instructions within are true and accurate.

## 2025-04-02 NOTE — HEALTH RISK ASSESSMENT
[0] : 2) Feeling down, depressed, or hopeless: Not at all (0) [PHQ-2 Negative - No further assessment needed] : PHQ-2 Negative - No further assessment needed [Never] : Never [[id number]] : 0

## 2025-04-02 NOTE — HISTORY OF PRESENT ILLNESS
[Family Member] : family member [FreeTextEntry1] : 2-month f/u [de-identified] : Ms. HAYES is a 63 year old F with PMHx of HLD, HTN, Hypothyroid, PreDM, fibromyalgia, chronic shoulder pain, knee pain, bell's palsy, presenting for 2-month f/u. Pt wants to fill me in on all the doctor's she saw since last visit thus reviewed recent specialist visit notes and tests she had done.  Overall, pt feels well. No complaints. Denies chest pain, SOB, MARTINEZ, dizziness, diaphoresis, palpitations, orthopnea, syncope, n/v, headache. Wants more PT for bells palsy.

## 2025-04-02 NOTE — HISTORY OF PRESENT ILLNESS
[Family Member] : family member [FreeTextEntry1] : 2-month f/u [de-identified] : Ms. HAYES is a 63 year old F with PMHx of HLD, HTN, Hypothyroid, PreDM, fibromyalgia, chronic shoulder pain, knee pain, bell's palsy, presenting for 2-month f/u. Pt wants to fill me in on all the doctor's she saw since last visit thus reviewed recent specialist visit notes and tests she had done.  Overall, pt feels well. No complaints. Denies chest pain, SOB, MARTINEZ, dizziness, diaphoresis, palpitations, orthopnea, syncope, n/v, headache. Wants more PT for bells palsy.

## 2025-04-02 NOTE — HEALTH RISK ASSESSMENT
[0] : 2) Feeling down, depressed, or hopeless: Not at all (0) [PHQ-2 Negative - No further assessment needed] : PHQ-2 Negative - No further assessment needed [Never] : Never [[id number]] : 0

## 2025-04-07 NOTE — PHYSICAL EXAM
[General Appearance - Well Developed] : well developed [Thin] : thin [Extraocular Movements] : extraocular movements were intact [Outer Ear] : the ears and nose were normal in appearance [] : no respiratory distress [Heart Sounds] : normal S1 and S2 [Normal] : no palpable adenopathy [Cranial Nerves Optic (II)] : visual acuity and visual fields were intact [Cranial Nerves Oculomotor (III)] : the extraocular motions were intact [Cranial Nerves Hypoglossal (XII)] : there was no tongue deviation with protrusion [Cranial Nerves Facial Central - Left Only] : central 7th nerve weakness [Cranial Nerves Facial Peripheral - Left Only] : peripheral 7th nerve weakness [Flattening Of Nasolabial Fold On The Left] : flattening of the  nasolabial fold [Mouth Droop On The Left] : left-sided mouth droop [Cranial Nerves Left Facial: House Grade ___(1-6)] : grade II (slight, 80%) facial nerve function [Finger Rub Not Bradford] : finger rub was not heard [Whisper Not Dawes] : whispered voice was not heard [Oriented To Time, Place, And Person] : oriented to person, place, and time [de-identified] : Left posterior tongue globus lesion on visual inspection with tongue depressor

## 2025-04-07 NOTE — PHYSICAL EXAM
[General Appearance - Well Developed] : well developed [Thin] : thin [Extraocular Movements] : extraocular movements were intact [Outer Ear] : the ears and nose were normal in appearance [] : no respiratory distress [Heart Sounds] : normal S1 and S2 [Normal] : no palpable adenopathy [Cranial Nerves Optic (II)] : visual acuity and visual fields were intact [Cranial Nerves Oculomotor (III)] : the extraocular motions were intact [Cranial Nerves Hypoglossal (XII)] : there was no tongue deviation with protrusion [Cranial Nerves Facial Central - Left Only] : central 7th nerve weakness [Cranial Nerves Facial Peripheral - Left Only] : peripheral 7th nerve weakness [Flattening Of Nasolabial Fold On The Left] : flattening of the  nasolabial fold [Mouth Droop On The Left] : left-sided mouth droop [Cranial Nerves Left Facial: House Grade ___(1-6)] : grade II (slight, 80%) facial nerve function [Finger Rub Not Lares] : finger rub was not heard [Whisper Not Willacy] : whispered voice was not heard [Oriented To Time, Place, And Person] : oriented to person, place, and time [de-identified] : Left posterior tongue globus lesion on visual inspection with tongue depressor

## 2025-04-07 NOTE — HISTORY OF PRESENT ILLNESS
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 She has left sided complete hearing loss since 2010. No acute symptoms of brain stem compression. S/p radiation total dose 2500 cGy. Completed treatment 2/8/19.  . Last seen in 4/2023. At that time Left bells palsy improving. Having pain and numbness of left face. Mild loss of balance and dysequilibrium, occasional falls, but improved since RT. he was discahrged from the service at that time.  MR head 7/17/2024 showed: Grossly unchanged appearing left vestibular schwannoma when compared with 5/22/2023.  MRI neck 3/20/2025 showed: Rounded enhancing lesion in the base of the left hemitongue, without invasive features remain indeterminate. Recommend correlation with direct visualization and if indicated histological evaluation. Rounded structure in the mid of the neck, nonspecific may reflect residual thyroid tissue. Please correlate with histological findings. Redemonstration of residual enhancing lesion in the left CP angle/IAC, presumably vestibular schwannoma. No acute cerebral ischemia, abdominal hemorrhages or parenchymal enhancing lesions. Incidental findings of diffuse edematous changes/abnormal enhancement along involving right nasolabial/facial soft tissue which can be due to ongoing cellulitis although other etiologies including neoplastic cannot be fully excluded. Correlation with focal exam and clinical history is recommended. Signal abnormality in maxillary canine and incisor which may potentially represent odontogenic source. General dental follow-up is recommended.  3/20/2025 Anterior neck FNA done showed: NECK, ANTERIOR, MIDLINE, US GUIDED, FNA - Thyroid tissue  01/04/25 CT Chest/ Abdomen/Pelvis IMPRESSION: No CT evidence of occult malignancy, however limited evaluation of solid organ vessels secondary to lack of intravenous contrast administration.  3/021/25 Bone Density - IMPRESSION: Normal Bone Density.  MRI Neck 3/20/2025 : Rounded enhancing lesion in the base of the left hemitongue, without invasive features remain indeterminate. Recommend correlation with direct visualization and if indicated histological evaluation. Rounded structure in the mid of the neck, nonspecific may reflect residual thyroid tissue. Please correlate with histological findings. Redemonstration of residual enhancing lesion in the left CP angle/IAC, presumably vestibular schwannoma. No acute cerebral ischemia, abdominal hemorrhages or parenchymal enhancing lesions. Incidental findings of diffuse edematous changes/abnormal enhancement along involving right nasolabial/facial soft tissue which can be due to ongoing cellulitis although other etiologies including neoplastic cannot be fully excluded. Correlation with focal exam and clinical history is recommended. Signal abnormality in maxillary canine and incisor which may potentially represent odontogenic source. General dental follow-up is recommended.  4/07/2025- Continues following with heme onc for microcytic anemia and thrombocytosis.  Follows with endocrine for hypothyroidism s/p thyroidectomy in 2017, with history of papillary thyroid carcinoma at the time of a surgery for a very large nodular goiter.  She has complaints of pain in the left throat and also swallowing difficulty. He met with ENT and endocrinologist for the same and she underwent biopsy of the thyroid lesion and awaiting follow-up with Endocrinologist to discuss the pathology report. She states that the facial weakness is getting better. SHe still suffers from hearing loss on the left ear. She has imbalance which resulted in fall last Jan, 2025. Her major concern is the throat pain for which she wants to discuss further evaluation with Dr Gloria.

## 2025-04-07 NOTE — REVIEW OF SYSTEMS
[Loss of Hearing] : loss of hearing [Joint Pain] : joint pain [Anxiety] : anxiety [Negative] : Integumentary [Dysphagia] : no dysphagia [Nosebleeds] : no nosebleeds [Muscle Pain] : no muscle pain [Confused] : no confusion [Dizziness] : no dizziness [Fainting] : no fainting [Suicidal] : not suicidal [Insomnia] : no insomnia [Depression] : no depression [Proptosis] : no proptosis [Hot Flashes] : no hot flashes [Muscle Weakness] : no muscle weakness [FreeTextEntry3] : Left eye dryness [FreeTextEntry4] : left ear hearing loss [de-identified] : c/o imbalance

## 2025-04-07 NOTE — PHYSICAL EXAM
[General Appearance - Well Developed] : well developed [Thin] : thin [Extraocular Movements] : extraocular movements were intact [Outer Ear] : the ears and nose were normal in appearance [] : no respiratory distress [Heart Sounds] : normal S1 and S2 [Normal] : no palpable adenopathy [Cranial Nerves Optic (II)] : visual acuity and visual fields were intact [Cranial Nerves Oculomotor (III)] : the extraocular motions were intact [Cranial Nerves Hypoglossal (XII)] : there was no tongue deviation with protrusion [Cranial Nerves Facial Central - Left Only] : central 7th nerve weakness [Cranial Nerves Facial Peripheral - Left Only] : peripheral 7th nerve weakness [Flattening Of Nasolabial Fold On The Left] : flattening of the  nasolabial fold [Mouth Droop On The Left] : left-sided mouth droop [Cranial Nerves Left Facial: House Grade ___(1-6)] : grade II (slight, 80%) facial nerve function [Finger Rub Not Perry] : finger rub was not heard [Whisper Not Caroline] : whispered voice was not heard [Oriented To Time, Place, And Person] : oriented to person, place, and time [de-identified] : Left posterior tongue globus lesion on visual inspection with tongue depressor

## 2025-04-07 NOTE — REVIEW OF SYSTEMS
[Loss of Hearing] : loss of hearing [Joint Pain] : joint pain [Anxiety] : anxiety [Negative] : Integumentary [Dysphagia] : no dysphagia [Nosebleeds] : no nosebleeds [Muscle Pain] : no muscle pain [Confused] : no confusion [Dizziness] : no dizziness [Fainting] : no fainting [Suicidal] : not suicidal [Insomnia] : no insomnia [Depression] : no depression [Proptosis] : no proptosis [Hot Flashes] : no hot flashes [Muscle Weakness] : no muscle weakness [FreeTextEntry3] : Left eye dryness [FreeTextEntry4] : left ear hearing loss [de-identified] : c/o imbalance

## 2025-04-07 NOTE — HISTORY OF PRESENT ILLNESS
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 She has left sided complete hearing loss since 2010. No acute symptoms of brain stem compression. S/p radiation total dose 2500 cGy. Completed treatment 2/8/19.  . Last seen in 4/2023. At that time Left bells palsy improving. Having pain and numbness of left face. Mild loss of balance and dysequilibrium, occasional falls, but improved since RT. he was discahrged from the service at that time.  MR head 7/17/2024 showed: Grossly unchanged appearing left vestibular schwannoma when compared with 5/22/2023.  MRI neck 3/20/2025 showed: Rounded enhancing lesion in the base of the left hemitongue, without invasive features remain indeterminate. Recommend correlation with direct visualization and if indicated histological evaluation. Rounded structure in the mid of the neck, nonspecific may reflect residual thyroid tissue. Please correlate with histological findings. Redemonstration of residual enhancing lesion in the left CP angle/IAC, presumably vestibular schwannoma. No acute cerebral ischemia, abdominal hemorrhages or parenchymal enhancing lesions. Incidental findings of diffuse edematous changes/abnormal enhancement along involving right nasolabial/facial soft tissue which can be due to ongoing cellulitis although other etiologies including neoplastic cannot be fully excluded. Correlation with focal exam and clinical history is recommended. Signal abnormality in maxillary canine and incisor which may potentially represent odontogenic source. General dental follow-up is recommended.  3/20/2025 Anterior neck FNA done showed: NECK, ANTERIOR, MIDLINE, US GUIDED, FNA - Thyroid tissue  01/04/25 CT Chest/ Abdomen/Pelvis IMPRESSION: No CT evidence of occult malignancy, however limited evaluation of solid organ vessels secondary to lack of intravenous contrast administration.  3/021/25 Bone Density - IMPRESSION: Normal Bone Density.  MRI Neck 3/20/2025 : Rounded enhancing lesion in the base of the left hemitongue, without invasive features remain indeterminate. Recommend correlation with direct visualization and if indicated histological evaluation. Rounded structure in the mid of the neck, nonspecific may reflect residual thyroid tissue. Please correlate with histological findings. Redemonstration of residual enhancing lesion in the left CP angle/IAC, presumably vestibular schwannoma. No acute cerebral ischemia, abdominal hemorrhages or parenchymal enhancing lesions. Incidental findings of diffuse edematous changes/abnormal enhancement along involving right nasolabial/facial soft tissue which can be due to ongoing cellulitis although other etiologies including neoplastic cannot be fully excluded. Correlation with focal exam and clinical history is recommended. Signal abnormality in maxillary canine and incisor which may potentially represent odontogenic source. General dental follow-up is recommended.  4/07/2025- Continues following with heme onc for microcytic anemia and thrombocytosis.  Follows with endocrine for hypothyroidism s/p thyroidectomy in 2017, with history of papillary thyroid carcinoma at the time of a surgery for a very large nodular goiter.  She has complaints of pain in the left throat and also swallowing difficulty. He met with ENT and endocrinologist for the same and she underwent biopsy of the thyroid lesion and awaiting follow-up with Endocrinologist to discuss the pathology report. She states that the facial weakness is getting better. SHe still suffers from hearing loss on the left ear. She has imbalance which resulted in fall last Jan, 2025. Her major concern is the throat pain for which she wants to discuss further evaluation with Dr Gloria.

## 2025-04-07 NOTE — REVIEW OF SYSTEMS
[Loss of Hearing] : loss of hearing [Joint Pain] : joint pain [Anxiety] : anxiety [Negative] : Integumentary [Dysphagia] : no dysphagia [Nosebleeds] : no nosebleeds [Muscle Pain] : no muscle pain [Confused] : no confusion [Dizziness] : no dizziness [Fainting] : no fainting [Suicidal] : not suicidal [Insomnia] : no insomnia [Depression] : no depression [Proptosis] : no proptosis [Hot Flashes] : no hot flashes [Muscle Weakness] : no muscle weakness [FreeTextEntry3] : Left eye dryness [FreeTextEntry4] : left ear hearing loss [de-identified] : c/o imbalance

## 2025-04-09 NOTE — HISTORY OF PRESENT ILLNESS
[FreeTextEntry1] : This is a 62 y/o female with a pmhx of  HLD, HTN, Hypothyroid, PreDM, fibromyalgia, chronic shoulder pain, knee pain, bell's palsy, presenting for a follow up. Patient with fatigue. She had biopsy of thyroid 3/2025. Patient had MR neck 3/2025 Rounded enhancing lesion in the base of the left hemitongue, without invasive features remain indeterminate. Recommend correlation with direct visualization and if indicated histological evaluation. Rounded structure in the mid of the neck, nonspecific may reflect residual thyroid tissue. Please correlate with histological findings. Redemonstration of residual enhancing lesion in the left CP angle/IAC, presumably vestibular schwannoma. No acute cerebral ischemia, abdominal hemorrhages or parenchymal enhancing lesions.pt with rare fatigue  Incidental findings of diffuse edematous changes/abnormal enhancement along involving right nasolabial/facial soft tissue which can be due to ongoing cellulitis although other etiologies including neoplastic cannot be fully excluded. Correlation with focal exam and clinical history is recommended. Signal abnormality in maxillary canine and incisor which may potentially represent odontogenic source. General dental follow-up is recommended.

## 2025-04-11 NOTE — PHYSICAL EXAM
[Normal Appearance] : normal appearance [Well Groomed] : well groomed [General Appearance - In No Acute Distress] : no acute distress [Edema] : no peripheral edema [Respiration, Rhythm And Depth] : normal respiratory rhythm and effort [Exaggerated Use Of Accessory Muscles For Inspiration] : no accessory muscle use [Abdomen Soft] : soft [Abdomen Tenderness] : non-tender [Normal Station and Gait] : the gait and station were normal for the patient's age [] : no rash [No Focal Deficits] : no focal deficits [Oriented To Time, Place, And Person] : oriented to person, place, and time [Affect] : the affect was normal [Mood] : the mood was normal [No Palpable Adenopathy] : no palpable adenopathy [de-identified] : due to void 170 ml

## 2025-04-11 NOTE — ASSESSMENT
[FreeTextEntry1] : 1- repeat urine 6m - 12 ml  2- f/u 6m  or sooner as needed 3- UROFLOW Today : voided 133 ml in 16 sec at MF of 16 ml /sec in bell pattern and pvr 40 ml

## 2025-04-11 NOTE — HISTORY OF PRESENT ILLNESS
[FreeTextEntry1] : Ms. RILEY HAYES is a 63-year-old female who returns today in follow up with regard to a history of hypothyroidism. She is s/p s/p total thyroidectomy in 2017. The patient was diagnosed with small focus of papillary thyroid ca noted at her surgery for very large nodular goiter several years ago.   Additional medical history includes of hypertension, hypokalemia, CPA tumor, Pre-Dm, Thalassemia Trait and vitamin d deficiency. 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 Still pain rt side of face, rt shoulder, and in multiple joints. Undergoing Rheumatology eval. Is doing electrode therapy.   Thyroglobulin stable on 08/05/24 at 13.50 with negative antibodies.  Thyroid US 7/2023 showed level 2 and level 5 LN. Bx per Dr. Ramos- result pos for reactive LN.  Repeat thyroid US 1/30/24 did show indeterminate isoechoic nodule with cystic spaces in the midline neck, measuring 2.2 x 0.9 x 1.5 cm possibly residual thyroid tissue. Consider further evaluation with CT neck with contrast. Unchanged b/l cervical lymph nodes, without suspicious imaging features.  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   For the hypothyroidism, the patient is currently taking LT4 125 mcg daily.  She did miss 1-2 doses of Synthroid due to the flu.  TSH had returned low at 0.05 in Feb 2024 while taking Synthroid 137 mcg daily. TSH returned 4.69 on 6/14/24 on dose of 112 mcg daily.  TSH highly elevated at 16.60 on 01/06/25. Recent bloodwork from April 9th, 2025 showed TSH elevated at 9.29, free T4 1.1.   She has been compliant in taking the LT4 daily, away from food or any medication that may inhibit absorption. She has tolerated this medication well. Without any apparent adverse effects. She denies any temperature intolerance, significant weight changes, or severe fatigue. She in addition denies any palpitations, tremors, anxiousness, change in bowel habits or significant change in moods.  Patient has lost about 30 pounds over the last year. She has been following dietary discretion and increased her physical activity. Her dermatologist was worried about anorexia as the patient had lost some weight; now has gained some back. Her current weight is 133 pounds, previously 120 pounds in Nov 2024.  Saw ophthalmology and being monitored for floaters.  Recent DEXA from April 2025 normal.  ____________________________________________________________________________________________________ Had colonoscopy with Dr. Spear.  Received first cortisone injection in right shoulder and 3rd injection in left knee. Following with ortho and has started PT.  Has been going to PT for her Bell's palsy and her shoulder.  Like Opal, she too contracted the flu and accompanied him to [facility]. She had shivers but denied sweats. While Opal received a Covid test, she was not checked at all. Entire family is sick with the flu.  Incr peter/renin ratio- is though now on spironolactone  Neurologist: Dr. Guerrier - retired-now to find new neurologist. Nephrologist : Dr. Nunn Surgeon: Dr. Elif Garza?

## 2025-04-11 NOTE — ADDENDUM
[FreeTextEntry1] : Blood will be drawn in office today.  This note was written by Griselda Buckley on 04/11/2025 acting as medical scribe for Dr. Haim Samson. I, Dr. Haim Samson, have read and attest that all the information, medical decision making and discharge instructions within are true and accurate.

## 2025-04-11 NOTE — HISTORY OF PRESENT ILLNESS
[FreeTextEntry1] : Dr Rodriguez ( april 9 , 2025) "Microscopic hematuria follows with urology. recent UA noted" Feb 2025: CT SCAN (Jan 2025) : negative upper tracts CYSTO today : normal bladder and urethra wiht clear efflux bilat 1- recheck urine 6- 12 m 2- rtc 6- 12 m or sooner as needed.  March 7, 2025 patient here for f/u dysuria and urgency mayur at night for past 2 d no hematuria or INC , no fever or SP or flnak pain reports recent GI eval wiht colonscopy ( neg by report ) not due again for 10 years avg fluid intake no bowel issues  Here for f/u of LUTS  reviewed prior SC urine done for UTI sxs: negative  patent admits that nocturia dec from before but ' I am paranoid " and decied to f/u luts  no hematuria, good flow, feels empty after void , steady flow

## 2025-04-15 NOTE — HISTORY OF PRESENT ILLNESS
[de-identified] : Pt with hx of thyroid surgery 2017 being f/u with Dr. Samson ( recent neck bx showed thyroid tissue)  and hx of left vestibular schwannoma presented with complete loss of hearing on the left side was treated with 25Gy in 5 fx in 2019 and referred for lesion on her left tongue on MRI.  MRI 3/2025 Rounded enhancing lesion in the base of the left hemitongue, without invasive features remain indeterminate. Recommend correlation with direct visualization and if indicated histological evaluation.

## 2025-04-23 NOTE — ASSESSMENT
[FreeTextEntry1] : 1) Nevi/SKs - Counseled about clinically benign lesions - Discussed regular OTC sunscreen use, SPF 30 or higher   2) "skin changes" - Patient notes skin changes on the legs after a prior course of cellulitis. she has xerosis and severe lymphedema. may trial ammonium lactate  - There is no inflammatory rash or hyperpigmentation noted on the exam today   3) Skin cancer Screening - No lesions clinically concerning for malignancy today - Discussed regular self skin checks and ABCDEs of skin cancer screening - Discussed regular sunscreen use - Pt instructed to report any new or changing lesions  RTC 1 yr for FBSE or sooner if any concerns

## 2025-04-23 NOTE — HISTORY OF PRESENT ILLNESS
[FreeTextEntry1] : moles [de-identified] : 64 y/o F presenting for f/u for above. Requests mole check and FBSE.   ** Attending only per patient request**

## 2025-04-26 NOTE — ADDENDUM
[FreeTextEntry1] : This note was written by Ana Salinas on 04/25/2025 acting as medical scribe for Dr. Akhil Rinaldi. I, Dr. Akhil Rinaldi, have read and attest that all the information, medical decision making and discharge instructions within are true and accurate.

## 2025-04-26 NOTE — PHYSICAL EXAM
[No Acute Distress] : no acute distress [Well Nourished] : well nourished [Well Developed] : well developed [Well-Appearing] : well-appearing [Normal Sclera/Conjunctiva] : normal sclera/conjunctiva [PERRL] : pupils equal round and reactive to light [EOMI] : extraocular movements intact [Normal Outer Ear/Nose] : the outer ears and nose were normal in appearance [Normal Oropharynx] : the oropharynx was normal [No JVD] : no jugular venous distention [No Lymphadenopathy] : no lymphadenopathy [Supple] : supple [Thyroid Normal, No Nodules] : the thyroid was normal and there were no nodules present [No Respiratory Distress] : no respiratory distress  [No Accessory Muscle Use] : no accessory muscle use [Clear to Auscultation] : lungs were clear to auscultation bilaterally [Normal Rate] : normal rate  [Regular Rhythm] : with a regular rhythm [Normal S1, S2] : normal S1 and S2 [No Murmur] : no murmur heard [No Carotid Bruits] : no carotid bruits [No Varicosities] : no varicosities [Pedal Pulses Present] : the pedal pulses are present [No Edema] : there was no peripheral edema [No Extremity Clubbing/Cyanosis] : no extremity clubbing/cyanosis [Soft] : abdomen soft [Non Tender] : non-tender [Non-distended] : non-distended [Normal Bowel Sounds] : normal bowel sounds [Normal Posterior Cervical Nodes] : no posterior cervical lymphadenopathy [Normal Anterior Cervical Nodes] : no anterior cervical lymphadenopathy [No CVA Tenderness] : no CVA  tenderness [No Spinal Tenderness] : no spinal tenderness [No Joint Swelling] : no joint swelling [Grossly Normal Strength/Tone] : grossly normal strength/tone [No Rash] : no rash [Coordination Grossly Intact] : coordination grossly intact [No Focal Deficits] : no focal deficits [Normal Gait] : normal gait [Normal Affect] : the affect was normal [Normal Insight/Judgement] : insight and judgment were intact [de-identified] : mild tenderness at biopsy site, no overlying skin changes, no redness, warmth, or heat

## 2025-04-26 NOTE — HEALTH RISK ASSESSMENT
[0] : 2) Feeling down, depressed, or hopeless: Not at all (0) [PHQ-2 Negative - No further assessment needed] : PHQ-2 Negative - No further assessment needed [Never] : Never [[id number]] : 0

## 2025-04-26 NOTE — HEALTH RISK ASSESSMENT
[0] : 2) Feeling down, depressed, or hopeless: Not at all (0) [PHQ-2 Negative - No further assessment needed] : PHQ-2 Negative - No further assessment needed [Never] : Never [[id number]] : 0

## 2025-04-26 NOTE — HISTORY OF PRESENT ILLNESS
[Family Member] : family member [FreeTextEntry1] : discuss mammo findings and breast discomfort.  [de-identified] : Ms. HAYES is a 63 year old F with PMHx of HLD, HTN, Hypothyroid, PreDM, fibromyalgia, chronic shoulder pain, knee pain, bell's palsy, presenting for f/u. Pt reports pain by left nipple after cyst removal in the past which is worse after mammogram on Monday. Mammo on 4/21/25: BIRADs 3, probably benign. Denies chest pain, sob, ellison, dizziness, diaphoresis, palpitations, LE swelling, orthopnea, syncope, n/v, headache.

## 2025-04-26 NOTE — PHYSICAL EXAM
[No Acute Distress] : no acute distress [Well Nourished] : well nourished [Well Developed] : well developed [Well-Appearing] : well-appearing [Normal Sclera/Conjunctiva] : normal sclera/conjunctiva [PERRL] : pupils equal round and reactive to light [EOMI] : extraocular movements intact [Normal Outer Ear/Nose] : the outer ears and nose were normal in appearance [Normal Oropharynx] : the oropharynx was normal [No JVD] : no jugular venous distention [No Lymphadenopathy] : no lymphadenopathy [Supple] : supple [Thyroid Normal, No Nodules] : the thyroid was normal and there were no nodules present [No Respiratory Distress] : no respiratory distress  [No Accessory Muscle Use] : no accessory muscle use [Clear to Auscultation] : lungs were clear to auscultation bilaterally [Normal Rate] : normal rate  [Regular Rhythm] : with a regular rhythm [Normal S1, S2] : normal S1 and S2 [No Murmur] : no murmur heard [No Carotid Bruits] : no carotid bruits [No Varicosities] : no varicosities [Pedal Pulses Present] : the pedal pulses are present [No Edema] : there was no peripheral edema [No Extremity Clubbing/Cyanosis] : no extremity clubbing/cyanosis [Soft] : abdomen soft [Non Tender] : non-tender [Non-distended] : non-distended [Normal Bowel Sounds] : normal bowel sounds [Normal Posterior Cervical Nodes] : no posterior cervical lymphadenopathy [Normal Anterior Cervical Nodes] : no anterior cervical lymphadenopathy [No CVA Tenderness] : no CVA  tenderness [No Spinal Tenderness] : no spinal tenderness [No Joint Swelling] : no joint swelling [Grossly Normal Strength/Tone] : grossly normal strength/tone [No Rash] : no rash [Coordination Grossly Intact] : coordination grossly intact [No Focal Deficits] : no focal deficits [Normal Gait] : normal gait [Normal Affect] : the affect was normal [Normal Insight/Judgement] : insight and judgment were intact [de-identified] : mild tenderness at biopsy site, no overlying skin changes, no redness, warmth, or heat

## 2025-04-26 NOTE — HISTORY OF PRESENT ILLNESS
[Family Member] : family member [FreeTextEntry1] : discuss mammo findings and breast discomfort.  [de-identified] : Ms. HAYES is a 63 year old F with PMHx of HLD, HTN, Hypothyroid, PreDM, fibromyalgia, chronic shoulder pain, knee pain, bell's palsy, presenting for f/u. Pt reports pain by left nipple after cyst removal in the past which is worse after mammogram on Monday. Mammo on 4/21/25: BIRADs 3, probably benign. Denies chest pain, sob, ellison, dizziness, diaphoresis, palpitations, LE swelling, orthopnea, syncope, n/v, headache.

## 2025-05-10 NOTE — PHYSICAL EXAM
[Normal] : supple, no neck mass and thyroid not enlarged [Normal Neck Lymph Nodes] : normal neck lymph nodes  [Normal Supraclavicular Lymph Nodes] : normal supraclavicular lymph nodes [Normal Groin Lymph Nodes] : normal groin lymph nodes [Normal Axillary Lymph Nodes] : normal axillary lymph nodes [Normal] : normal appearance, no rash, nodules, vesicles, ulcers, erythema [de-identified] : Groins not examined [de-identified] : See diagram

## 2025-05-10 NOTE — REVIEW OF SYSTEMS
[Negative] : Genitourinary [FreeTextEntry4] : Bell's palsy [FreeTextEntry5] : Hypertension [FreeTextEntry7] : Iodine and shellfish [FreeTextEntry8] : Diverticular disease [de-identified] : Arthritis [de-identified] : Benign nevi [de-identified] : Vestibular schwannoma [de-identified] : Thyroid cancer [FreeTextEntry1] : Thalassemia

## 2025-05-10 NOTE — PHYSICAL EXAM
[Normal] : supple, no neck mass and thyroid not enlarged [Normal Neck Lymph Nodes] : normal neck lymph nodes  [Normal Supraclavicular Lymph Nodes] : normal supraclavicular lymph nodes [Normal Groin Lymph Nodes] : normal groin lymph nodes [Normal Axillary Lymph Nodes] : normal axillary lymph nodes [Normal] : normal appearance, no rash, nodules, vesicles, ulcers, erythema [de-identified] : Groins not examined [de-identified] : See diagram

## 2025-05-10 NOTE — REASON FOR VISIT
[Initial Consultation] : an initial consultation for [FreeTextEntry2] : Baseline breast examination left breast pain since ~November 2024.

## 2025-05-10 NOTE — HISTORY OF PRESENT ILLNESS
Message #1 left for patient to call back to inform of Lesa GARDUNO's note.     [de-identified] : 63-year-old lady.  Referred by her internist: Dr. Avinash CONDE.  Reason for visit: Baseline breast exam.   CC: Pain at left breast biopsy site-chronic. Aggravated by 2024 breast imaging.   + Prior personal history: 2017: Sonogram guided core needle biopsy of her left breast: Papilloma. 2017: Right breast sonogram guided core needle biopsy: Benign concordant.  2021: Breast : BI-RADS 2.   Excision of the left breast papilloma had been recommended by radiology...........     2017: Thyroidectomy for PAPILLARY CARCINOMA of the THYROID.   + Additional personal history: Left vestibular/temple schwannoma extremity into the left internal auditory canal. Treated with radiation therapy. Completed 2019. Radiation oncology: Dr. Paresh YANES.  Subsequent Bell's palsy in .  Neurology: Dr. Jostin SMITH.    Breast imagin/15/2024: Bilateral mamm[facility]: BI-RADS 2.  + Microcytic anemia. + Thalassemia trait. Hematology: Dr. Ronald ÁLVAREZ.   + FH: Melanoma.  + Additional family history: Father: Thalassemia trait. Son: Thalassemia trait.     PMD: Dr. Avinash CONDE. Also sees his associate, Dr. Akhil SORIANO.  + ALLERGIC: Iodine. Shellfish.    No pacemaker or defibrillator. No anticoagulants.  +81 mg aspirin daily.  + Hypertension. Controlled with amlodipine triamterene HCTZ and metoprolol.  + Hypercholesterolemia. Takes atorvastatin.  Bilateral lower extremity edema.  + Surgical hypothyroidism (total thyroidectomy). History of PAPILLARY THRYOID CANCER. Treated with levothyroxine.  Endocrinology: Dr. Haim GUPTA.  + History of microcytic anemia and thrombocytosis. + Thalassemia trait Hematology: Dr. Ronald ÁLVAREZ  + Osteoarthritis. Takes daily meloxicam. Right shoulder pain since ~spring 2022. Orthopedics: Dr. Alexandr LYNN. Rheumatology (in the past) Dr. Maria Guadalupe PAIGE.  + Urinary frequency, dysuria, and UTIs. Urology: Dr. Joanna DAIGLE.  + Benign nevi. Annual dermatology examinations. Dr. Chris Diallo. Most recent visit was 2025.  + Transvaginal Yuvafem, twice weekly.     History of diverticular disease. History of serrated colon polyp. Next colonoscopy is due in . GI: Dr. Jorge JARAMILLO.  There was a patient with a handlebar mustache that was 1 yeah otherwise it in the 70s I think it were a 40s yeah 3 times in the 70s was in the 80s when they went no once in the 80s and 70s once in the 80s

## 2025-05-10 NOTE — ASSESSMENT
[FreeTextEntry1] : 63-year-old lady.  History of breast papilloma, and benign right breast biopsy.  Also, history of thalassemia minor, and thyroid cancer.   Referred for the evaluation of left breast pain.      Reviewed in detail, all questions answered.  Transition contacted through secure email.

## 2025-05-10 NOTE — REVIEW OF SYSTEMS
[Negative] : Genitourinary [FreeTextEntry4] : Bell's palsy [FreeTextEntry5] : Hypertension [FreeTextEntry7] : Iodine and shellfish [FreeTextEntry8] : Diverticular disease [de-identified] : Arthritis [de-identified] : Benign nevi [de-identified] : Vestibular schwannoma [de-identified] : Thyroid cancer [FreeTextEntry1] : Thalassemia

## 2025-05-10 NOTE — HISTORY OF PRESENT ILLNESS
[de-identified] : 63-year-old lady.  Referred by her internist: Dr. Avinash CONDE.  Reason for visit: Baseline breast exam.   CC: Pain at left breast biopsy site-chronic. Aggravated by 2024 breast imaging.   + Prior personal history: 2017: Sonogram guided core needle biopsy of her left breast: Papilloma. 2017: Right breast sonogram guided core needle biopsy: Benign concordant.  2021: Breast : BI-RADS 2.   Excision of the left breast papilloma had been recommended by radiology...........     2017: Thyroidectomy for PAPILLARY CARCINOMA of the THYROID.   + Additional personal history: Left vestibular/temple schwannoma extremity into the left internal auditory canal. Treated with radiation therapy. Completed 2019. Radiation oncology: Dr. Paresh YANES.  Subsequent Bell's palsy in .  Neurology: Dr. Jostin SMITH.    Breast imagin/15/2024: [facility]: BI-RADS [facility].  + Microcytic anemia. + Thalassemia trait. Hematology: Dr. Ronald ÁLVAREZ.   + FH: Melanoma.  + Additional family history: Father: Thalassemia trait. Son: Thalassemia trait.     PMD: Dr. Avinash CONDE. Also sees his associate, Dr. Akhil SORIANO.  + ALLERGIC: Iodine. Shellfish.    No pacemaker or defibrillator. No anticoagulants.  +81 mg aspirin daily.  + Hypertension. Controlled with amlodipine triamterene HCTZ and metoprolol.  + Hypercholesterolemia. Takes atorvastatin.  Bilateral lower extremity edema.  + Surgical hypothyroidism (total thyroidectomy). History of PAPILLARY THRYOID CANCER. Treated with levothyroxine.  Endocrinology: Dr. Haim GUPTA.  + History of microcytic anemia and thrombocytosis. + Thalassemia trait Hematology: Dr. Ronald ÁLVAREZ  + Osteoarthritis. Takes daily meloxicam. Right shoulder pain since ~spring 2022. Orthopedics: Dr. Alexandr LYNN. Rheumatology (in the past) Dr. Maria Guadalupe PAIGE.  + Urinary frequency, dysuria, and UTIs. Urology: Dr. Joanna DAIGLE.  + Benign nevi. Annual dermatology examinations. Dr. Chris Diallo. Most recent visit was 2025.  + Transvaginal Yuvafem, twice weekly.     History of diverticular disease. History of serrated colon polyp. Next colonoscopy is due in . GI: Dr. Jorge JARAMILLO.  There was a patient with a handlebar mustache that was 1 yeah otherwise it in the 70s I think it were a 40s yeah 3 times in the 70s was in the 80s when they went no once in the 80s and 70s once in the 80s

## 2025-05-14 NOTE — PHYSICAL EXAM
[Normal] : Gait: normal [de-identified] : Cervical Spine/Neck Inspection/Palpation :  Inspection : alignment midline, normal degree of lordosis present  Skin : normal appearance, no masses, no tenderness, trachea midline  Palpation : musculature is nontender to palpation  Tests and Signs : Spurlings (+) to the right, Lhermittes (-)  Range of Motion : arc of motion full in all planes with mild left sided rotation, no crepitus or pain with ROM  Stability : no subluxations or other evidence of instability demonstrated during range of motion testing o Muscle Strength : paraspinal muscle strength within normal limits o Muscle Tone : paraspinal muscle tone within normal limits o Muscle Bulk : normal, no atrophy o Cervical Lymph Nodes : no lymphadenopathy present     Right Upper Extremity  o Shoulder : Inspection/Palpation :   tenderness to palpation over the greater tuberosity, no tenderness to palpation over the AC joint, no swelling, no deformities Range of Motion : ACTIVE FORWARD ELEVATION: Measured at 95   degrees, ACTIVE EXTERNAL ROTATION: Measured at  35  degrees, ACTIVE INTERNAL ROTATION: Measured at L1  Strength : external rotation 5/5, internal rotation 5/5, supraspinatus 5/5 Stability : no joint instability on provocative testing Tests/Signs : Neer (+), Ulloa (-) o Upper Arm : no tenderness, no swelling, no deformities o Muscle Bulk : no atrophy o Sensation : sensation intact to light touch o Skin : no skin rash or discoloration o Vascular Exam : no edema, no cyanosis, radial and ulnar pulses normal   Left Upper Extremity o Shoulder :  Inspection/Palpation :  no tenderness to palpation over the greater tuberosity , no tenderness to palpation over the AC joint, no swelling, no deformities  Range of Motion : ACTIVE FORWARD ELEVATION: Measured at  130 degrees, ACTIVE EXTERNAL ROTATION: Measured at   50  degrees, ACTIVE INTERNAL ROTATION: Measured at  L1  Strength : external rotation 5/5, internal rotation 5/5, supraspinatus 5/5  Stability : no joint instability on provocative testing  Tests/Signs : Neer (-), Ulloa (-)  o Upper Arm : no tenderness, no swelling, no deformities  o Muscle Bulk : no atrophy  o Sensation : sensation intact to light touch  o Skin : no skin rash or discoloration  o Vascular Exam : no edema, no cyanosis, radial and ulnar pulses normal            [de-identified] : Previous X-rays show  o Cervical Spine : AP, lateral, and oblique views were obtained, there are no soft tissue abnormalities, no fractures, alignment is normal, diffuse deg disc deg C3-C6, normal bone density, no bony lesions   o Right  Shoulder : Grashey, Axillary and Outlet views were obtained, there are no soft tissue abnormalities, no fractures, alignment is normal, severe arthritis of glenohumeral joint with inferior osteophyte formation, normal bone density, no bony lesions.

## 2025-05-14 NOTE — PHYSICAL EXAM
[Normal] : Gait: normal [de-identified] : Cervical Spine/Neck Inspection/Palpation :  Inspection : alignment midline, normal degree of lordosis present  Skin : normal appearance, no masses, no tenderness, trachea midline  Palpation : musculature is nontender to palpation  Tests and Signs : Spurlings (+) to the right, Lhermittes (-)  Range of Motion : arc of motion full in all planes with mild left sided rotation, no crepitus or pain with ROM  Stability : no subluxations or other evidence of instability demonstrated during range of motion testing o Muscle Strength : paraspinal muscle strength within normal limits o Muscle Tone : paraspinal muscle tone within normal limits o Muscle Bulk : normal, no atrophy o Cervical Lymph Nodes : no lymphadenopathy present     Right Upper Extremity  o Shoulder : Inspection/Palpation :   tenderness to palpation over the greater tuberosity, no tenderness to palpation over the AC joint, no swelling, no deformities Range of Motion : ACTIVE FORWARD ELEVATION: Measured at 95   degrees, ACTIVE EXTERNAL ROTATION: Measured at  35  degrees, ACTIVE INTERNAL ROTATION: Measured at L1  Strength : external rotation 5/5, internal rotation 5/5, supraspinatus 5/5 Stability : no joint instability on provocative testing Tests/Signs : Neer (+), Ulloa (-) o Upper Arm : no tenderness, no swelling, no deformities o Muscle Bulk : no atrophy o Sensation : sensation intact to light touch o Skin : no skin rash or discoloration o Vascular Exam : no edema, no cyanosis, radial and ulnar pulses normal   Left Upper Extremity o Shoulder :  Inspection/Palpation :  no tenderness to palpation over the greater tuberosity , no tenderness to palpation over the AC joint, no swelling, no deformities  Range of Motion : ACTIVE FORWARD ELEVATION: Measured at  130 degrees, ACTIVE EXTERNAL ROTATION: Measured at   50  degrees, ACTIVE INTERNAL ROTATION: Measured at  L1  Strength : external rotation 5/5, internal rotation 5/5, supraspinatus 5/5  Stability : no joint instability on provocative testing  Tests/Signs : Neer (-), Ulloa (-)  o Upper Arm : no tenderness, no swelling, no deformities  o Muscle Bulk : no atrophy  o Sensation : sensation intact to light touch  o Skin : no skin rash or discoloration  o Vascular Exam : no edema, no cyanosis, radial and ulnar pulses normal            [de-identified] : Previous X-rays show  o Cervical Spine : AP, lateral, and oblique views were obtained, there are no soft tissue abnormalities, no fractures, alignment is normal, diffuse deg disc deg C3-C6, normal bone density, no bony lesions   o Right  Shoulder : Grashey, Axillary and Outlet views were obtained, there are no soft tissue abnormalities, no fractures, alignment is normal, severe arthritis of glenohumeral joint with inferior osteophyte formation, normal bone density, no bony lesions.

## 2025-05-14 NOTE — DISCUSSION/SUMMARY
[de-identified] : The underlying pathophysiology was reviewed in great detail with the patient as well as the various treatment options, including ice, analgesics, NSAIDs, Physical therapy, steroid injections. Activity modifications and restrictions were discussed.    I advised avoiding overhead lifting. I advised the patient to work on good posture.   A prescription for Meloxicam was provided.   Patient received a cortisone injection to the right shoulder on 3/12/2025.   I recommended a course of physical therapy for the right shoulder to improve ROM and strength. A script was provided today.   FU 2 months or prn.   All questions were answered, all alternatives discussed and the patient is in complete agreement with that plan. Follow-up appointment as instructed. Any issues and the patient will call or come in sooner.

## 2025-05-14 NOTE — HISTORY OF PRESENT ILLNESS
[de-identified] : 64 y/o FREDDY LIN presents for follow up of right shoulder pain. Patient was previously seen on 03/12/2025 and had received a cortisone injection to the right shoulder with significant relief. Patient had been sparingly attending Physical therapy with relief, she is inquiring today about renewing a physical therapy script.

## 2025-05-14 NOTE — DISCUSSION/SUMMARY
[de-identified] : The underlying pathophysiology was reviewed in great detail with the patient as well as the various treatment options, including ice, analgesics, NSAIDs, Physical therapy, steroid injections. Activity modifications and restrictions were discussed.    I advised avoiding overhead lifting. I advised the patient to work on good posture.   A prescription for Meloxicam was provided.   Patient received a cortisone injection to the right shoulder on 3/12/2025.   I recommended a course of physical therapy for the right shoulder to improve ROM and strength. A script was provided today.   FU 2 months or prn.   All questions were answered, all alternatives discussed and the patient is in complete agreement with that plan. Follow-up appointment as instructed. Any issues and the patient will call or come in sooner.

## 2025-05-14 NOTE — HISTORY OF PRESENT ILLNESS
[de-identified] : 62 y/o FREDDY LIN presents for follow up of right shoulder pain. Patient was previously seen on 03/12/2025 and had received a cortisone injection to the right shoulder with significant relief. Patient had been sparingly attending Physical therapy with relief, she is inquiring today about renewing a physical therapy script.

## 2025-05-14 NOTE — ADDENDUM
[FreeTextEntry1] :  All medical record entries made by Cezar Junior were at my, Dr. Alexandr Cowan, direction and personally dictated by me on 03/12/2025. I have reviewed the chart and agree that the record accurately reflects my personal performance of the history, physical exam, assessment and plan. I have also personally directed, reviewed, and agreed with the chart.

## 2025-06-04 NOTE — REASON FOR VISIT
[Follow-up] : a follow-up of an existing diagnosis [FreeTextEntry1] : diverticulosis no abdominal pain

## 2025-06-04 NOTE — ASSESSMENT
[FreeTextEntry1] : 62 yo female, hx of thal trait, anemia, bells palsy, referred for colon cancer screening. Last colonoscopy more than 10 years ago. No family hx of colon cancer. No rectal bleeding. NO sob or cp.  RTO today 1/25/24- colonoscopy with dim serrated polyp and diverticulosis.Had mild diverticulitis post procedure responded to doxycyline. Recent fell on steps, has right rib pain.  RTO 7/24/24- s/p diverticulitis after colonoscopy. Feels better, has normal bms. has right shoulder and knee pain s/p injection. Has bells palsy. Doing relatively well per pts description. NO sob or cp.Gained 8 lbs. since May 2024  RTO 3/4/25- weight STABLE since 8/24- has some bloating, no fever or chills. S/p recent cold. states COVID neg. No n/v. Has regular bms.Reviewed previous colonoscopy this vandana which demonstated diverticluosis.Recent cat scan abd/pelvis without significant issues.  RTO 6/4/25- has regular bms, occ constipation; Weight stable. Pending followup brealy miller and thryoid md. No /chest pain, sob/ N/v. Has right shoulder pain. Needs to see new H and Neck md as Dr. Ramos no longer takes her insurance. Recent tsh 9 with normal free t4- synthroid increased to 137 mg. FNA thryoid reviewd- no overt malignant cells. Recent URI, without fever. IMP: 1. serrated polyp 2. stable weight  3. Hx of diverticulosis  PLAN: 1prn followup 2. next colonoscopy 7 years due to serrated polyp 3. followup wiht Dr. Rodriguez, and Dr. Espino   Total time spent caring for the patient today was  31    minutes. This includes time spent before the visit reviewing the chart ( provider notes, imaging and lab results), time spent during the visit and time spent after the visit on documentation after the patient left the office to complete my progress note/consultation.

## 2025-06-04 NOTE — HISTORY OF PRESENT ILLNESS
[FreeTextEntry1] : 62 yo female, hx of thal trait, anemia, bells palsy, referred for colon cancer screening. Last colonoscopy more than 10 years ago. No family hx of colon cancer. No rectal bleeding. NO sob or cp.  RTO today 1/25/24- colonoscopy with dim serrated polyp and diverticulosis.Had mild diverticulitis post procedure responded to doxycyline. Recent fell on steps, has right rib pain.  RTO 7/24/24- s/p diverticulitis after colonoscopy. Feels better, has normal bms. has right shoulder and knee pain s/p injection. Has bells palsy. Doing relatively well per pts description. NO sob or cp.Gained 8 lbs. since May 2024  RTO 3/4/25- weight STABLE since 8/24- has some bloating, no fever or chills. S/p recent cold. states COVID neg. No n/v. Has regular bms.Reviewed previous colonoscopy this vandana which demonstated diverticluosis.Recent cat scan abd/pelvis without significant issues.  RTO 6/4/25- has regular bms, occ constipation; Weight stable. Pending followup alyson miller and buddyyoid md. No /chest pain, sob/ N/v. Has right shoulder pain. Needs to see new H and Neck md as Dr. Ramos no longer takes her insurance. Recent tsh 9 with normal free t4- synthroid increased to 137 mg. FNA thryoid reviewd- no overt malignant cells. Recent URI, without fever. [de-identified] : 2022 chest /abd/pelvis neg  01/2025 neg

## 2025-06-04 NOTE — PHYSICAL EXAM
[Alert] : alert [Normal Voice/Communication] : normal voice/communication [Healthy Appearing] : healthy appearing [No Acute Distress] : no acute distress [Sclera] : the sclera and conjunctiva were normal [Hearing Threshold Finger Rub Not [place]] : hearing was normal [Normal Lips/Gums] : the lips and gums were normal [Oropharynx] : the oropharynx was normal [Normal Appearance] : the appearance of the neck was normal [No Neck Mass] : no neck mass was observed [No Respiratory Distress] : no respiratory distress [No Acc Muscle Use] : no accessory muscle use [Respiration, Rhythm And Depth] : normal respiratory rhythm and effort [Auscultation Breath Sounds / Voice Sounds] : lungs were clear to auscultation bilaterally [Heart Rate And Rhythm] : heart rate was normal and rhythm regular [Normal S1, S2] : normal S1 and S2 [Murmurs] : no murmurs [Bowel Sounds] : normal bowel sounds [Abdomen Tenderness] : non-tender [No Masses] : no abdominal mass palpated [Abdomen Soft] : soft [] : no hepatosplenomegaly [Oriented To Time, Place, And Person] : oriented to person, place, and time [de-identified] : right rib pain, no defomity or crepitus

## 2025-06-18 NOTE — HISTORY OF PRESENT ILLNESS
[FreeTextEntry1] : Ms. RILEY HAYES is a 64-year-old female who returns today in follow up with regard to a history of hypothyroidism. She is s/p s/p total thyroidectomy in 2017. The patient was diagnosed with small focus of papillary thyroid ca noted at her surgery for very large nodular goiter several years ago.   Additional medical history includes of hypertension, hypokalemia, CPA tumor, Pre-Dm, Thalassemia Trait and vitamin d deficiency. 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 Still pain rt side of face, rt shoulder, and in multiple joints. Undergoing Rheumatology eval. Is doing electrode therapy.   Thyroglobulin stable on 08/05/24 at 13.50 with negative antibodies.  Thyroid US 7/2023 showed level 2 and level 5 LN. Bx per Dr. Ramos- result pos for reactive LN.  Repeat thyroid US 1/30/24 did show indeterminate isoechoic nodule with cystic spaces in the midline neck, measuring 2.2 x 0.9 x 1.5 cm possibly residual thyroid tissue. Consider further evaluation with CT neck with contrast. Unchanged b/l cervical lymph nodes, without suspicious imaging features.  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  Latest thyroid US from 02/14/25 showed a suspicious solid area at midline in the neck, possibly mass or possibly residual thyroid tissue, not noted on previous ultrasound, recommend FNA histologic sampling.  ****FNA on 03/20/25 of the midline anterior neck region showed no overt malignant cells seen in the current sample. Too noted an additional hypoechoic mass in the base of the tongue on the left.   For the hypothyroidism, the patient is currently taking LT4 137 mcg daily.  Labs 04/09/25 showed TSH elevated at 9.29 and FT4 at 1.1.  She has been compliant in taking the LT4 daily, away from food or any medication that may inhibit absorption. She has tolerated this medication well. Without any apparent adverse effects. She denies any temperature intolerance, significant weight changes, or severe fatigue. She in addition denies any palpitations, tremors, anxiousness, change in bowel habits or significant change in moods.  Patient has lost about 30 pounds over the last year. She has been following dietary discretion and increased her physical activity. Her dermatologist was worried about anorexia as the patient had lost some weight; now has gained some back. Her current weight is ?? pounds, previously 120 pounds in Nov 2024.  Saw ophthalmology and being monitored for floaters.  Recent DEXA from April 2025 normal.  ____________________________________________________________________________________________________ Had colonoscopy with Dr. Spear.  Received first cortisone injection in right shoulder and 3rd injection in left knee. Following with ortho and has started PT.  Has been going to PT for her Bell's palsy and her shoulder.  Like Opal, she too contracted the flu and accompanied him to [facility]. She had shivers but denied sweats. While Opal received a Covid test, she was not checked at all. Entire family is sick with the flu.  Incr peter/renin ratio- is though now on spironolactone  Neurologist: Dr. Guerrier - retired-now to find new neurologist. Nephrologist : Dr. Nunn Surgeon: Dr. Elif Garza?

## 2025-06-18 NOTE — REVIEW OF SYSTEMS
Jin called and states he wants to come to [facility] on Friday, April 18th - around 2pm.  Wants to make sure Dr. Dawson would be available when he rings the bell.  He did state he did not think Kalia would be there that day.  Jin wants to come on Friday since he will be bringing family that could join him for the bell ringing and did not want to wait until Tuesday for his follow up visit.     Please confirm if Friday is 2pm or if another time works better for patient to ring the bell with MD and team.    [Negative] : Heme/Lymph

## 2025-07-06 NOTE — HISTORY OF PRESENT ILLNESS
[FreeTextEntry1] : Ms. RILEY HAYES is a 64-year-old female who returns today in follow up with regard to a history of hypothyroidism. She is s/p s/p total thyroidectomy in 2017. The patient was diagnosed with small focus of papillary thyroid ca noted at her surgery for very large nodular goiter several years ago.   Additional medical history includes of hypertension, hypokalemia, CPA tumor, Pre-Dm, Thalassemia Trait and vitamin d deficiency. 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 Still pain rt side of face, rt shoulder, and in multiple joints. Undergoing Rheumatology eval. Is doing electrode therapy.   Thyroglobulin stable on 08/05/24 at 13.50 with negative antibodies.  Thyroid US 7/2023 showed level 2 and level 5 LN. Bx per Dr. Ramos- result pos for reactive LN.  Repeat thyroid US 1/30/24 did show indeterminate isoechoic nodule with cystic spaces in the midline neck, measuring 2.2 x 0.9 x 1.5 cm possibly residual thyroid tissue. Consider further evaluation with CT neck with contrast. Unchanged b/l cervical lymph nodes, without suspicious imaging features.  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  Latest thyroid US from 02/14/25 Impression: Suspicious solid area at midline in the neck, possibly mass or possibly residual thyroid tissue, not noted on previous ultrasound, recommend FNA histologic sampling. Mildly prominent left-sided cervical lymph nodes, similar to measuring slightly smaller.   FNA on 03/20/25 of the midline anterior neck showed no overt malignant cells seen in the current sample.  She did not f/u with ENT Dr. Guzmán re lesion on tongue. ____________________________________________________________________________________________________  For the hypothyroidism, the patient is currently taking LT4 125 mcg daily.   TSH had returned low at 0.05 in Feb 2024 while taking Synthroid 137 mcg daily. TSH returned 4.69 on 6/14/24 on dose of 112 mcg daily.  TSH highly elevated at 16.60 on 01/06/25. Too was elevated at 9.69 on 03/19/25. Latest labs from 04/09/25 showed TSH elevated at 9.29 with FT4 at 1.1.   She has been compliant in taking the LT4 daily, away from food or any medication that may inhibit absorption. She has tolerated this medication well. Without any apparent adverse effects. She denies any temperature intolerance, significant weight changes, or severe fatigue. She in addition denies any palpitations, tremors, anxiousness, change in bowel habits or significant change in moods.  Patient has lost about 30 pounds over the last year. She has been following dietary discretion and increased her physical activity. Her dermatologist was worried about anorexia as the patient had lost some weight; now has gained some back. Her current weight is 128 pounds, previously 120 pounds in Nov 2024.  Saw ophthalmology and being monitored for floaters.  Recent DEXA from April 2025 normal.   Has lost weight despite no change in diet. Her current weight is 128 pounds, previously 135 pounds in June 2025 ____________________________________________________________________________________________________ Had colonoscopy with Dr. Spear.  Received first cortisone injection in right shoulder and 3rd injection in left knee. Following with ortho and has started PT.  Has been going to PT for her Bell's palsy and her shoulder.  Like Opal, she too contracted the flu and accompanied him to [facility]. She had shivers but denied sweats. While Opal received a Covid test, she was not checked at all. Entire family is sick with the flu.  Incr peter/renin ratio- is though now on spironolactone  Neurologist: Dr. Guerrier - retired-now to find new neurologist. Nephrologist: Dr. Nunn Surgeon: Dr. Elif Kahn

## 2025-07-06 NOTE — ADDENDUM
[FreeTextEntry1] : Blood will be drawn in office today.  This note was written by Jamee Randle on 07/01/2025 acting as medical scribe for Dr. Haim Samson. I, Dr. Haim Samson, have read and attest that all the information, medical decision making and discharge instructions within are true and accurate.

## 2025-07-11 NOTE — PHYSICAL EXAM
[No Acute Distress] : no acute distress [Well Nourished] : well nourished [Well Developed] : well developed [Well-Appearing] : well-appearing [Normal Sclera/Conjunctiva] : normal sclera/conjunctiva [PERRL] : pupils equal round and reactive to light [EOMI] : extraocular movements intact [Normal Outer Ear/Nose] : the outer ears and nose were normal in appearance [Normal Oropharynx] : the oropharynx was normal [No JVD] : no jugular venous distention [No Lymphadenopathy] : no lymphadenopathy [Supple] : supple [Thyroid Normal, No Nodules] : the thyroid was normal and there were no nodules present [No Respiratory Distress] : no respiratory distress  [No Accessory Muscle Use] : no accessory muscle use [Clear to Auscultation] : lungs were clear to auscultation bilaterally [Normal Rate] : normal rate  [Regular Rhythm] : with a regular rhythm [Normal S1, S2] : normal S1 and S2 [No Murmur] : no murmur heard [No Carotid Bruits] : no carotid bruits [No Varicosities] : no varicosities [Pedal Pulses Present] : the pedal pulses are present [No Edema] : there was no peripheral edema [No Extremity Clubbing/Cyanosis] : no extremity clubbing/cyanosis [Soft] : abdomen soft [Non Tender] : non-tender [Non-distended] : non-distended [Normal Bowel Sounds] : normal bowel sounds [Normal Posterior Cervical Nodes] : no posterior cervical lymphadenopathy [Normal Anterior Cervical Nodes] : no anterior cervical lymphadenopathy [No CVA Tenderness] : no CVA  tenderness [No Spinal Tenderness] : no spinal tenderness [No Joint Swelling] : no joint swelling [Grossly Normal Strength/Tone] : grossly normal strength/tone [No Rash] : no rash [Coordination Grossly Intact] : coordination grossly intact [No Focal Deficits] : no focal deficits [Normal Gait] : normal gait [Normal Affect] : the affect was normal [Normal Insight/Judgement] : insight and judgment were intact [de-identified] : lesion/growth on posterior aspect of left tongue+

## 2025-07-11 NOTE — HEALTH RISK ASSESSMENT
[0] : 2) Feeling down, depressed, or hopeless: Not at all (0) [PHQ-2 Negative - No further assessment needed] : PHQ-2 Negative - No further assessment needed [Never] : Never [[id number]] : 0

## 2025-07-11 NOTE — PHYSICAL EXAM
[No Acute Distress] : no acute distress [Well Nourished] : well nourished [Well Developed] : well developed [Well-Appearing] : well-appearing [Normal Sclera/Conjunctiva] : normal sclera/conjunctiva [PERRL] : pupils equal round and reactive to light [EOMI] : extraocular movements intact [Normal Outer Ear/Nose] : the outer ears and nose were normal in appearance [Normal Oropharynx] : the oropharynx was normal [No JVD] : no jugular venous distention [No Lymphadenopathy] : no lymphadenopathy [Supple] : supple [Thyroid Normal, No Nodules] : the thyroid was normal and there were no nodules present [No Respiratory Distress] : no respiratory distress  [No Accessory Muscle Use] : no accessory muscle use [Clear to Auscultation] : lungs were clear to auscultation bilaterally [Normal Rate] : normal rate  [Regular Rhythm] : with a regular rhythm [Normal S1, S2] : normal S1 and S2 [No Murmur] : no murmur heard [No Carotid Bruits] : no carotid bruits [No Varicosities] : no varicosities [Pedal Pulses Present] : the pedal pulses are present [No Edema] : there was no peripheral edema [No Extremity Clubbing/Cyanosis] : no extremity clubbing/cyanosis [Soft] : abdomen soft [Non Tender] : non-tender [Non-distended] : non-distended [Normal Bowel Sounds] : normal bowel sounds [Normal Posterior Cervical Nodes] : no posterior cervical lymphadenopathy [Normal Anterior Cervical Nodes] : no anterior cervical lymphadenopathy [No CVA Tenderness] : no CVA  tenderness [No Spinal Tenderness] : no spinal tenderness [No Joint Swelling] : no joint swelling [Grossly Normal Strength/Tone] : grossly normal strength/tone [No Rash] : no rash [Coordination Grossly Intact] : coordination grossly intact [No Focal Deficits] : no focal deficits [Normal Gait] : normal gait [Normal Affect] : the affect was normal [Normal Insight/Judgement] : insight and judgment were intact [de-identified] : lesion/growth on posterior aspect of left tongue+

## 2025-07-11 NOTE — ADDENDUM
[FreeTextEntry1] : This note was written by Jeannette Webber on 07/01/2025 acting as medical scribe for Dr. Akhil Rinaldi. I, Dr. Akhil Rinaldi, have read and attest that all the information, medical decision making and discharge instructions within are true and accurate.

## 2025-07-11 NOTE — HISTORY OF PRESENT ILLNESS
[Family Member] : family member [FreeTextEntry1] : f/u chronic issues [de-identified] : Ms. HAYES is a 64 year F with PMHX of HLD, HTN, Hypothyroid, PreDM, fibromyalgia, chronic shoulder pain, knee pain, bell's palsy presenting for f/u. Was not able to see Dr Minor for chronic left breast pain and BIRAD 3 mammo, says she will reschedule appt. Denies chest pain, sob, ellison, dizziness, diaphoresis, palpitations, LE swelling, orthopnea, syncope, n/v, headache

## 2025-07-11 NOTE — HEALTH RISK ASSESSMENT
[0] : 2) Feeling down, depressed, or hopeless: Not at all (0) [PHQ-2 Negative - No further assessment needed] : PHQ-2 Negative - No further assessment needed [Never] : Never [[id number]] : 0

## 2025-07-11 NOTE — HEALTH RISK ASSESSMENT
[0] : 2) Feeling down, depressed, or hopeless: Not at all (0) [PHQ-2 Negative - No further assessment needed] : PHQ-2 Negative - No further assessment needed [Never] : Never [[id number]] : 0

## 2025-07-11 NOTE — HISTORY OF PRESENT ILLNESS
[Family Member] : family member [FreeTextEntry1] : f/u chronic issues [de-identified] : Ms. HAYES is a 64 year F with PMHX of HLD, HTN, Hypothyroid, PreDM, fibromyalgia, chronic shoulder pain, knee pain, bell's palsy presenting for f/u. Was not able to see Dr Minor for chronic left breast pain and BIRAD 3 mammo, says she will reschedule appt. Denies chest pain, sob, ellison, dizziness, diaphoresis, palpitations, LE swelling, orthopnea, syncope, n/v, headache

## 2025-07-11 NOTE — PHYSICAL EXAM
normal sinus rhythm [No Acute Distress] : no acute distress [Well Nourished] : well nourished [Well Developed] : well developed [Well-Appearing] : well-appearing [Normal Sclera/Conjunctiva] : normal sclera/conjunctiva [PERRL] : pupils equal round and reactive to light [EOMI] : extraocular movements intact [Normal Outer Ear/Nose] : the outer ears and nose were normal in appearance [Normal Oropharynx] : the oropharynx was normal [No JVD] : no jugular venous distention [No Lymphadenopathy] : no lymphadenopathy [Supple] : supple [Thyroid Normal, No Nodules] : the thyroid was normal and there were no nodules present [No Respiratory Distress] : no respiratory distress  [No Accessory Muscle Use] : no accessory muscle use [Clear to Auscultation] : lungs were clear to auscultation bilaterally [Normal Rate] : normal rate  [Regular Rhythm] : with a regular rhythm [Normal S1, S2] : normal S1 and S2 [No Murmur] : no murmur heard [No Carotid Bruits] : no carotid bruits [No Varicosities] : no varicosities [Pedal Pulses Present] : the pedal pulses are present [No Edema] : there was no peripheral edema [No Extremity Clubbing/Cyanosis] : no extremity clubbing/cyanosis [Soft] : abdomen soft [Non Tender] : non-tender [Non-distended] : non-distended [Normal Bowel Sounds] : normal bowel sounds [Normal Posterior Cervical Nodes] : no posterior cervical lymphadenopathy [Normal Anterior Cervical Nodes] : no anterior cervical lymphadenopathy [No CVA Tenderness] : no CVA  tenderness [No Spinal Tenderness] : no spinal tenderness [No Joint Swelling] : no joint swelling [Grossly Normal Strength/Tone] : grossly normal strength/tone [No Rash] : no rash [Coordination Grossly Intact] : coordination grossly intact [No Focal Deficits] : no focal deficits [Normal Gait] : normal gait [Normal Affect] : the affect was normal [Normal Insight/Judgement] : insight and judgment were intact [de-identified] : lesion/growth on posterior aspect of left tongue+

## 2025-07-11 NOTE — HISTORY OF PRESENT ILLNESS
[Family Member] : family member [FreeTextEntry1] : f/u chronic issues [de-identified] : Ms. HAYES is a 64 year F with PMHX of HLD, HTN, Hypothyroid, PreDM, fibromyalgia, chronic shoulder pain, knee pain, bell's palsy presenting for f/u. Was not able to see Dr Minor for chronic left breast pain and BIRAD 3 mammo, says she will reschedule appt. Denies chest pain, sob, ellison, dizziness, diaphoresis, palpitations, LE swelling, orthopnea, syncope, n/v, headache